# Patient Record
Sex: FEMALE | Race: OTHER | HISPANIC OR LATINO
[De-identification: names, ages, dates, MRNs, and addresses within clinical notes are randomized per-mention and may not be internally consistent; named-entity substitution may affect disease eponyms.]

---

## 2018-03-16 ENCOUNTER — APPOINTMENT (OUTPATIENT)
Dept: DERMATOLOGY | Facility: CLINIC | Age: 38
End: 2018-03-16
Payer: COMMERCIAL

## 2018-03-16 ENCOUNTER — LABORATORY RESULT (OUTPATIENT)
Age: 38
End: 2018-03-16

## 2018-03-16 VITALS
HEIGHT: 63 IN | SYSTOLIC BLOOD PRESSURE: 134 MMHG | DIASTOLIC BLOOD PRESSURE: 60 MMHG | BODY MASS INDEX: 35.44 KG/M2 | WEIGHT: 200 LBS

## 2018-03-16 DIAGNOSIS — L30.9 DERMATITIS, UNSPECIFIED: ICD-10-CM

## 2018-03-16 DIAGNOSIS — D48.9 NEOPLASM OF UNCERTAIN BEHAVIOR, UNSPECIFIED: ICD-10-CM

## 2018-03-16 PROCEDURE — 99203 OFFICE O/P NEW LOW 30 MIN: CPT | Mod: 25

## 2018-03-16 PROCEDURE — 11100 BX SKIN SUBCUTANEOUS&/MUCOUS MEMBRANE 1 LESION: CPT

## 2018-03-17 PROBLEM — L30.9 NIPPLE DERMATITIS: Status: ACTIVE | Noted: 2018-03-16

## 2018-03-17 LAB
ENA RNP AB SER IA-ACNC: <0.2 AL
ENA SM AB SER IA-ACNC: <0.2 AL

## 2018-03-26 ENCOUNTER — APPOINTMENT (OUTPATIENT)
Dept: DERMATOLOGY | Facility: CLINIC | Age: 38
End: 2018-03-26
Payer: COMMERCIAL

## 2018-03-26 VITALS — DIASTOLIC BLOOD PRESSURE: 70 MMHG | SYSTOLIC BLOOD PRESSURE: 112 MMHG

## 2018-03-26 DIAGNOSIS — Z87.2 PERSONAL HISTORY OF DISEASES OF THE SKIN AND SUBCUTANEOUS TISSUE: ICD-10-CM

## 2018-03-26 LAB
ANA PAT FLD IF-IMP: ABNORMAL
ANA SER IF-ACNC: ABNORMAL
ENA SS-A AB SER IA-ACNC: <0.2 AL
ENA SS-B AB SER IA-ACNC: <0.2 AL

## 2018-03-26 PROCEDURE — 99213 OFFICE O/P EST LOW 20 MIN: CPT

## 2018-05-10 ENCOUNTER — APPOINTMENT (OUTPATIENT)
Dept: RHEUMATOLOGY | Facility: CLINIC | Age: 38
End: 2018-05-10
Payer: COMMERCIAL

## 2018-05-10 ENCOUNTER — LABORATORY RESULT (OUTPATIENT)
Age: 38
End: 2018-05-10

## 2018-05-10 VITALS
OXYGEN SATURATION: 97 % | HEIGHT: 63 IN | DIASTOLIC BLOOD PRESSURE: 89 MMHG | BODY MASS INDEX: 37.21 KG/M2 | TEMPERATURE: 98.2 F | SYSTOLIC BLOOD PRESSURE: 143 MMHG | WEIGHT: 210 LBS | HEART RATE: 71 BPM

## 2018-05-10 DIAGNOSIS — R76.8 OTHER SPECIFIED ABNORMAL IMMUNOLOGICAL FINDINGS IN SERUM: ICD-10-CM

## 2018-05-10 LAB
APTT BLD: 30.4 SEC
BASOPHILS # BLD AUTO: 0.02 K/UL
BASOPHILS NFR BLD AUTO: 0.3 %
EOSINOPHIL # BLD AUTO: 0.15 K/UL
EOSINOPHIL NFR BLD AUTO: 2 %
HCT VFR BLD CALC: 35.4 %
HGB BLD-MCNC: 11.1 G/DL
IMM GRANULOCYTES NFR BLD AUTO: 0.3 %
INR PPP: 0.94 RATIO
LYMPHOCYTES # BLD AUTO: 2.43 K/UL
LYMPHOCYTES NFR BLD AUTO: 32.5 %
MAN DIFF?: NORMAL
MCHC RBC-ENTMCNC: 26.4 PG
MCHC RBC-ENTMCNC: 31.4 GM/DL
MCV RBC AUTO: 84.3 FL
MONOCYTES # BLD AUTO: 0.48 K/UL
MONOCYTES NFR BLD AUTO: 6.4 %
NEUTROPHILS # BLD AUTO: 4.37 K/UL
NEUTROPHILS NFR BLD AUTO: 58.5 %
PLATELET # BLD AUTO: 333 K/UL
PT BLD: 10.6 SEC
RBC # BLD: 4.2 M/UL
RBC # FLD: 12.6 %
WBC # FLD AUTO: 7.47 K/UL

## 2018-05-10 PROCEDURE — 99244 OFF/OP CNSLTJ NEW/EST MOD 40: CPT

## 2018-05-11 LAB
ALBUMIN SERPL ELPH-MCNC: 4.2 G/DL
ALP BLD-CCNC: 77 U/L
ALT SERPL-CCNC: 14 U/L
ANION GAP SERPL CALC-SCNC: 15 MMOL/L
AST SERPL-CCNC: 16 U/L
BILIRUB SERPL-MCNC: 0.2 MG/DL
BUN SERPL-MCNC: 11 MG/DL
CALCIUM SERPL-MCNC: 9.2 MG/DL
CHLORIDE SERPL-SCNC: 102 MMOL/L
CK SERPL-CCNC: 49 U/L
CO2 SERPL-SCNC: 23 MMOL/L
CREAT SERPL-MCNC: 0.65 MG/DL
GLUCOSE SERPL-MCNC: 93 MG/DL
MPO AB + PR3 PNL SER: NORMAL
POTASSIUM SERPL-SCNC: 4.6 MMOL/L
PROT SERPL-MCNC: 7.5 G/DL
SODIUM SERPL-SCNC: 140 MMOL/L

## 2018-05-12 LAB
ACE BLD-CCNC: 37 U/L
B2 GLYCOPROT1 AB SER QL: POSITIVE
B2 GLYCOPROT1 IGA SERPL IA-ACNC: <5 SAU
DSDNA AB SER-ACNC: <12 IU/ML
THYROGLOB AB SERPL-ACNC: <20 IU/ML
THYROPEROXIDASE AB SERPL IA-ACNC: 14 IU/ML

## 2018-05-15 LAB — CARDIOLIPIN AB SER IA-ACNC: NEGATIVE

## 2018-05-17 ENCOUNTER — APPOINTMENT (OUTPATIENT)
Dept: DERMATOLOGY | Facility: CLINIC | Age: 38
End: 2018-05-17
Payer: COMMERCIAL

## 2018-05-17 VITALS — DIASTOLIC BLOOD PRESSURE: 70 MMHG | SYSTOLIC BLOOD PRESSURE: 120 MMHG

## 2018-05-17 DIAGNOSIS — L30.1 DYSHIDROSIS [POMPHOLYX]: ICD-10-CM

## 2018-05-17 DIAGNOSIS — L81.0 POSTINFLAMMATORY HYPERPIGMENTATION: ICD-10-CM

## 2018-05-17 DIAGNOSIS — L53.1 ERYTHEMA ANNULARE CENTRIFUGUM: ICD-10-CM

## 2018-05-17 PROCEDURE — 99213 OFFICE O/P EST LOW 20 MIN: CPT

## 2018-05-18 PROBLEM — L53.1 ERYTHEMA ANNULARE CENTRIFUGUM: Status: ACTIVE | Noted: 2018-03-26

## 2019-04-30 ENCOUNTER — TRANSCRIPTION ENCOUNTER (OUTPATIENT)
Age: 39
End: 2019-04-30

## 2020-04-25 ENCOUNTER — MESSAGE (OUTPATIENT)
Age: 40
End: 2020-04-25

## 2020-05-01 ENCOUNTER — APPOINTMENT (OUTPATIENT)
Dept: OBGYN | Facility: CLINIC | Age: 40
End: 2020-05-01

## 2020-06-05 ENCOUNTER — LABORATORY RESULT (OUTPATIENT)
Age: 40
End: 2020-06-05

## 2020-06-05 ENCOUNTER — APPOINTMENT (OUTPATIENT)
Dept: OBGYN | Facility: CLINIC | Age: 40
End: 2020-06-05
Payer: COMMERCIAL

## 2020-06-05 VITALS
DIASTOLIC BLOOD PRESSURE: 95 MMHG | HEART RATE: 81 BPM | TEMPERATURE: 98.2 F | SYSTOLIC BLOOD PRESSURE: 148 MMHG | BODY MASS INDEX: 38.62 KG/M2 | WEIGHT: 218 LBS | HEIGHT: 63 IN

## 2020-06-05 DIAGNOSIS — Z01.419 ENCOUNTER FOR GYNECOLOGICAL EXAMINATION (GENERAL) (ROUTINE) W/OUT ABNORMAL FINDINGS: ICD-10-CM

## 2020-06-05 DIAGNOSIS — Z80.49 FAMILY HISTORY OF MALIGNANT NEOPLASM OF OTHER GENITAL ORGANS: ICD-10-CM

## 2020-06-05 PROCEDURE — 99385 PREV VISIT NEW AGE 18-39: CPT

## 2020-06-05 NOTE — HISTORY OF PRESENT ILLNESS
[___ Year(s) Ago] : [unfilled] year(s) ago [Healthy Diet] : a healthy diet [Regular Exercise] : regular exercise [Weight Concerns] : weight concens [Last Pap Smear ___] : last Papanicolaou cytology done [unfilled] [Reproductive Age] : is of reproductive age [Menstrual Problems] : reports normal menses [Up to Date] : up to date with ~his/her~ STD screening [Definite:  ___ (Date)] : the last menstrual period was [unfilled] [Normal Amount/Duration] : was of a normal amount and duration [Spotting Between  Menses] : no spotting between menses [Regular Cycle Intervals] : periods have been regular [Frequency: Q ___ days] : menstrual periods occur approximately every [unfilled] days [Menarche Age: ____] : age at menarche was [unfilled] [Menstrual Cramps] : menstrual cramps [Sexually Active] : is sexually active

## 2020-06-08 LAB
ESTRADIOL SERPL-MCNC: 70 PG/ML
FSH SERPL-MCNC: 5.1 IU/L
LH SERPL-ACNC: 6.9 IU/L
PROLACTIN SERPL-MCNC: 23.1 NG/ML
T3FREE SERPL-MCNC: 3.01 PG/ML
T4 FREE SERPL-MCNC: 1.2 NG/DL
TSH SERPL-ACNC: 1.32 UIU/ML

## 2020-06-11 LAB
CYTOLOGY CVX/VAG DOC THIN PREP: NORMAL
HPV HIGH+LOW RISK DNA PNL CVX: DETECTED

## 2020-06-12 LAB
ANTI-MUELLERIAN HORMONE: 0.24 NG/ML
TESTOST BND SERPL-MCNC: 1.7 PG/ML
TESTOST SERPL-MCNC: 17.5 NG/DL

## 2020-06-14 LAB — DHEA-SULFATE, SERUM: 111 UG/DL

## 2020-09-22 ENCOUNTER — ASOB RESULT (OUTPATIENT)
Age: 40
End: 2020-09-22

## 2020-09-22 ENCOUNTER — APPOINTMENT (OUTPATIENT)
Dept: OBGYN | Facility: CLINIC | Age: 40
End: 2020-09-22
Payer: COMMERCIAL

## 2020-09-22 PROCEDURE — 76830 TRANSVAGINAL US NON-OB: CPT

## 2020-10-16 ENCOUNTER — APPOINTMENT (OUTPATIENT)
Dept: OBGYN | Facility: CLINIC | Age: 40
End: 2020-10-16
Payer: COMMERCIAL

## 2020-10-16 DIAGNOSIS — R52 PAIN, UNSPECIFIED: ICD-10-CM

## 2020-10-16 LAB
HCG UR QL: NEGATIVE
QUALITY CONTROL: YES

## 2020-10-16 PROCEDURE — 57454 BX/CURETT OF CERVIX W/SCOPE: CPT

## 2020-10-16 PROCEDURE — 58110 BX DONE W/COLPOSCOPY ADD-ON: CPT

## 2020-10-16 RX ORDER — IBUPROFEN 600 MG/1
600 TABLET, FILM COATED ORAL
Qty: 0 | Refills: 0 | Status: COMPLETED | OUTPATIENT
Start: 2020-10-16

## 2020-10-21 LAB — CORE LAB BIOPSY: NORMAL

## 2020-11-09 ENCOUNTER — APPOINTMENT (OUTPATIENT)
Dept: GYNECOLOGIC ONCOLOGY | Facility: CLINIC | Age: 40
End: 2020-11-09
Payer: COMMERCIAL

## 2020-11-09 VITALS
SYSTOLIC BLOOD PRESSURE: 140 MMHG | OXYGEN SATURATION: 98 % | DIASTOLIC BLOOD PRESSURE: 82 MMHG | HEIGHT: 63 IN | WEIGHT: 212 LBS | TEMPERATURE: 98.4 F | HEART RATE: 74 BPM | BODY MASS INDEX: 37.56 KG/M2

## 2020-11-09 DIAGNOSIS — N85.02 ENDOMETRIAL INTRAEPITHELIAL NEOPLASIA [EIN]: ICD-10-CM

## 2020-11-09 DIAGNOSIS — R87.810 CERVICAL HIGH RISK HUMAN PAPILLOMAVIRUS (HPV) DNA TEST POSITIVE: ICD-10-CM

## 2020-11-09 PROCEDURE — 99072 ADDL SUPL MATRL&STAF TM PHE: CPT

## 2020-11-09 PROCEDURE — 99205 OFFICE O/P NEW HI 60 MIN: CPT

## 2020-12-04 ENCOUNTER — APPOINTMENT (OUTPATIENT)
Dept: INTERNAL MEDICINE | Facility: CLINIC | Age: 40
End: 2020-12-04
Payer: COMMERCIAL

## 2020-12-04 VITALS
OXYGEN SATURATION: 98 % | DIASTOLIC BLOOD PRESSURE: 96 MMHG | HEART RATE: 73 BPM | BODY MASS INDEX: 36.68 KG/M2 | SYSTOLIC BLOOD PRESSURE: 153 MMHG | TEMPERATURE: 98 F | HEIGHT: 63 IN | WEIGHT: 207 LBS

## 2020-12-04 VITALS — DIASTOLIC BLOOD PRESSURE: 72 MMHG | SYSTOLIC BLOOD PRESSURE: 114 MMHG

## 2020-12-04 DIAGNOSIS — Z01.818 ENCOUNTER FOR OTHER PREPROCEDURAL EXAMINATION: ICD-10-CM

## 2020-12-04 DIAGNOSIS — Z82.49 FAMILY HISTORY OF ISCHEMIC HEART DISEASE AND OTHER DISEASES OF THE CIRCULATORY SYSTEM: ICD-10-CM

## 2020-12-04 DIAGNOSIS — Z83.3 FAMILY HISTORY OF DIABETES MELLITUS: ICD-10-CM

## 2020-12-04 DIAGNOSIS — Z87.891 PERSONAL HISTORY OF NICOTINE DEPENDENCE: ICD-10-CM

## 2020-12-04 DIAGNOSIS — N93.9 ABNORMAL UTERINE AND VAGINAL BLEEDING, UNSPECIFIED: ICD-10-CM

## 2020-12-04 DIAGNOSIS — R73.01 IMPAIRED FASTING GLUCOSE: ICD-10-CM

## 2020-12-04 PROCEDURE — 99072 ADDL SUPL MATRL&STAF TM PHE: CPT

## 2020-12-04 PROCEDURE — 99205 OFFICE O/P NEW HI 60 MIN: CPT

## 2020-12-04 RX ORDER — CETIRIZINE HYDROCHLORIDE 10 MG/1
10 TABLET, FILM COATED ORAL
Qty: 60 | Refills: 0 | Status: COMPLETED | COMMUNITY
Start: 2018-03-26 | End: 2020-12-04

## 2020-12-04 RX ORDER — AZITHROMYCIN 250 MG/1
250 TABLET, FILM COATED ORAL DAILY
Qty: 30 | Refills: 0 | Status: COMPLETED | COMMUNITY
Start: 2018-03-26 | End: 2020-12-04

## 2020-12-04 RX ORDER — TRANEXAMIC ACID 650 MG/1
650 TABLET ORAL
Qty: 30 | Refills: 2 | Status: COMPLETED | COMMUNITY
Start: 2020-09-25 | End: 2020-12-04

## 2020-12-04 RX ORDER — HYDROXYZINE HYDROCHLORIDE 25 MG/1
25 TABLET ORAL
Qty: 30 | Refills: 2 | Status: COMPLETED | COMMUNITY
Start: 2018-03-26 | End: 2020-12-04

## 2020-12-08 ENCOUNTER — OUTPATIENT (OUTPATIENT)
Dept: OUTPATIENT SERVICES | Facility: HOSPITAL | Age: 40
LOS: 1 days | End: 2020-12-08

## 2020-12-08 VITALS
SYSTOLIC BLOOD PRESSURE: 140 MMHG | OXYGEN SATURATION: 98 % | WEIGHT: 210.1 LBS | HEART RATE: 76 BPM | TEMPERATURE: 99 F | DIASTOLIC BLOOD PRESSURE: 80 MMHG | RESPIRATION RATE: 16 BRPM | HEIGHT: 63 IN

## 2020-12-08 DIAGNOSIS — Z98.84 BARIATRIC SURGERY STATUS: Chronic | ICD-10-CM

## 2020-12-08 DIAGNOSIS — N85.02 ENDOMETRIAL INTRAEPITHELIAL NEOPLASIA [EIN]: ICD-10-CM

## 2020-12-08 DIAGNOSIS — Z90.49 ACQUIRED ABSENCE OF OTHER SPECIFIED PARTS OF DIGESTIVE TRACT: Chronic | ICD-10-CM

## 2020-12-08 LAB
ANION GAP SERPL CALC-SCNC: 10 MMOL/L — SIGNIFICANT CHANGE UP (ref 7–14)
BLD GP AB SCN SERPL QL: NEGATIVE — SIGNIFICANT CHANGE UP
BUN SERPL-MCNC: 12 MG/DL — SIGNIFICANT CHANGE UP (ref 7–23)
CALCIUM SERPL-MCNC: 9 MG/DL — SIGNIFICANT CHANGE UP (ref 8.4–10.5)
CHLORIDE SERPL-SCNC: 100 MMOL/L — SIGNIFICANT CHANGE UP (ref 98–107)
CO2 SERPL-SCNC: 26 MMOL/L — SIGNIFICANT CHANGE UP (ref 22–31)
CREAT SERPL-MCNC: 0.59 MG/DL — SIGNIFICANT CHANGE UP (ref 0.5–1.3)
GLUCOSE SERPL-MCNC: 83 MG/DL — SIGNIFICANT CHANGE UP (ref 70–99)
HCG UR QL: NEGATIVE — SIGNIFICANT CHANGE UP
HCT VFR BLD CALC: 37.1 % — SIGNIFICANT CHANGE UP (ref 34.5–45)
HGB BLD-MCNC: 11.5 G/DL — SIGNIFICANT CHANGE UP (ref 11.5–15.5)
MCHC RBC-ENTMCNC: 25.9 PG — LOW (ref 27–34)
MCHC RBC-ENTMCNC: 31 GM/DL — LOW (ref 32–36)
MCV RBC AUTO: 83.6 FL — SIGNIFICANT CHANGE UP (ref 80–100)
NRBC # BLD: 0 /100 WBCS — SIGNIFICANT CHANGE UP
NRBC # FLD: 0 K/UL — SIGNIFICANT CHANGE UP
PLATELET # BLD AUTO: 306 K/UL — SIGNIFICANT CHANGE UP (ref 150–400)
POTASSIUM SERPL-MCNC: 4 MMOL/L — SIGNIFICANT CHANGE UP (ref 3.5–5.3)
POTASSIUM SERPL-SCNC: 4 MMOL/L — SIGNIFICANT CHANGE UP (ref 3.5–5.3)
RBC # BLD: 4.44 M/UL — SIGNIFICANT CHANGE UP (ref 3.8–5.2)
RBC # FLD: 13.4 % — SIGNIFICANT CHANGE UP (ref 10.3–14.5)
RH IG SCN BLD-IMP: POSITIVE — SIGNIFICANT CHANGE UP
SODIUM SERPL-SCNC: 136 MMOL/L — SIGNIFICANT CHANGE UP (ref 135–145)
WBC # BLD: 10.26 K/UL — SIGNIFICANT CHANGE UP (ref 3.8–10.5)
WBC # FLD AUTO: 10.26 K/UL — SIGNIFICANT CHANGE UP (ref 3.8–10.5)

## 2020-12-08 RX ORDER — SODIUM CHLORIDE 9 MG/ML
1000 INJECTION, SOLUTION INTRAVENOUS
Refills: 0 | Status: DISCONTINUED | OUTPATIENT
Start: 2020-12-15 | End: 2020-12-29

## 2020-12-08 RX ORDER — SODIUM CHLORIDE 9 MG/ML
3 INJECTION INTRAMUSCULAR; INTRAVENOUS; SUBCUTANEOUS EVERY 8 HOURS
Refills: 0 | Status: DISCONTINUED | OUTPATIENT
Start: 2020-12-15 | End: 2020-12-29

## 2020-12-08 NOTE — H&P PST ADULT - ATTENDING COMMENTS
patient seen and evaluated, plan for RA TLH, BS, possible BSO, cystoscopy, possible open surgery  all indicated procedures  discussed risks including bleeding, infection, injury to bowel/bladder/vessels/nerves/ureters, risks of blood transfusion, reoperation, ICU admission  all questions answered

## 2020-12-08 NOTE — H&P PST ADULT - NEGATIVE CARDIOVASCULAR SYMPTOMS
no paroxysmal nocturnal dyspnea/no palpitations/no orthopnea/no dyspnea on exertion/no peripheral edema/no chest pain/no claudication

## 2020-12-08 NOTE — H&P PST ADULT - NSANTHOSAYNRD_GEN_A_CORE
No. DELANEY screening performed.  STOP BANG Legend: 0-2 = LOW Risk; 3-4 = INTERMEDIATE Risk; 5-8 = HIGH Risk

## 2020-12-08 NOTE — H&P PST ADULT - RS GEN PE MLT RESP DETAILS PC
airway patent/no rhonchi/no subcutaneous emphysema/no wheezes/clear to auscultation bilaterally/no intercostal retractions/no rales/good air movement/respirations non-labored/breath sounds equal/no chest wall tenderness

## 2020-12-08 NOTE — H&P PST ADULT - NSICDXPASTSURGICALHX_GEN_ALL_CORE_FT
PAST SURGICAL HISTORY:  No significant past surgical history      PAST SURGICAL HISTORY:  History of laparoscopic adjustable gastric banding 2004    History of laparoscopic cholecystectomy 2006

## 2020-12-08 NOTE — H&P PST ADULT - NSICDXPROBLEM_GEN_ALL_CORE_FT
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 Pre op instructions, famotidine, chlorhexidine gluconate soap given and explained. Pt verbalized understanding.  Pending medical consultation as per surgeon's request  Pt confirmed scheduled appt for Covid-19 test pre op  Pt instructed to bring urine specimen on day of surgery, container given to pt who verbalized understanding.

## 2020-12-08 NOTE — H&P PST ADULT - HISTORY OF PRESENT ILLNESS
39 y/o female presents to [facility] for pre op evaluation abnormal, irregular menstruation started on 03/2020, Eval by GYN on 05/2020. C/O intermittent severe pelvic pain , heavy and prolonged periods. S/P US. S/P biopsy on 11/2020. Pre op diagnosis: Endometrial Intraepithelial Neoplasia (EIN). Now scheduled for robotic assisted total laparoscopic hysterectomy, BSO, sentinel lymph node mapping and dissection on 12/15/2020 41 y/o female presents to [facility] for pre op evaluation with h/o abnormal, irregular menstruation started on 03/2020, Eval by GYN on 05/2020. C/O intermittent severe pelvic pain , heavy and prolonged periods. S/P US. S/P biopsy on 11/2020. Pre op diagnosis: Endometrial Intraepithelial Neoplasia (EIN). Now scheduled for robotic assisted total laparoscopic hysterectomy, BSO, sentinel lymph node mapping and dissection on 12/15/2020

## 2020-12-08 NOTE — H&P PST ADULT - NEGATIVE GENERAL GENITOURINARY SYMPTOMS
no renal colic/no hematuria/no flank pain L/no flank pain R/no bladder infections no flank pain R/no renal colic/normal urinary frequency/no gas in urine/no bladder infections/no dysuria/no hematuria/no flank pain L/no urine discoloration

## 2020-12-08 NOTE — H&P PST ADULT - NEGATIVE GASTROINTESTINAL SYMPTOMS
no constipation/no abdominal pain/no jaundice/no hiccoughs/no nausea/no vomiting/no diarrhea/no change in bowel habits no abdominal pain/no melena/no jaundice/no vomiting/no nausea/no diarrhea/no change in bowel habits/no constipation/no hiccoughs

## 2020-12-11 DIAGNOSIS — Z01.818 ENCOUNTER FOR OTHER PREPROCEDURAL EXAMINATION: ICD-10-CM

## 2020-12-11 NOTE — PHYSICAL EXAM
[No Acute Distress] : no acute distress [Well Nourished] : well nourished [Well Developed] : well developed [Normal Sclera/Conjunctiva] : normal sclera/conjunctiva [PERRL] : pupils equal round and reactive to light [EOMI] : extraocular movements intact [Normal Outer Ear/Nose] : the outer ears and nose were normal in appearance [Normal Oropharynx] : the oropharynx was normal [Normal TMs] : both tympanic membranes were normal [Normal Nasal Mucosa] : the nasal mucosa was normal [No JVD] : no jugular venous distention [No Lymphadenopathy] : no lymphadenopathy [Supple] : supple [No Respiratory Distress] : no respiratory distress  [Clear to Auscultation] : lungs were clear to auscultation bilaterally [Normal Rate] : normal rate  [Regular Rhythm] : with a regular rhythm [Normal S1, S2] : normal S1 and S2 [No Carotid Bruits] : no carotid bruits [Pedal Pulses Present] : the pedal pulses are present [No Edema] : there was no peripheral edema [No Extremity Clubbing/Cyanosis] : no extremity clubbing/cyanosis [Soft] : abdomen soft [Non Tender] : non-tender [Non-distended] : non-distended [Normal Bowel Sounds] : normal bowel sounds [Normal Supraclavicular Nodes] : no supraclavicular lymphadenopathy [Normal Posterior Cervical Nodes] : no posterior cervical lymphadenopathy [Normal Anterior Cervical Nodes] : no anterior cervical lymphadenopathy [No CVA Tenderness] : no CVA  tenderness [No Spinal Tenderness] : no spinal tenderness [No Joint Swelling] : no joint swelling [Grossly Normal Strength/Tone] : grossly normal strength/tone [No Rash] : no rash [Coordination Grossly Intact] : coordination grossly intact [No Focal Deficits] : no focal deficits [Normal Gait] : normal gait [Speech Grossly Normal] : speech grossly normal [Normal Affect] : the affect was normal [Alert and Oriented x3] : oriented to person, place, and time [Normal Mood] : the mood was normal [de-identified] : Obese female in stated age,

## 2020-12-11 NOTE — PLAN
[FreeTextEntry1] : \par \par ADDENDUM 12/11/2020 - Preop labs done on 12/8/2020:\par \par * CBC - normal\par * BMP - normal\par * Urine pregnancy test - negative\par \par Patient has no medical contraindication for proposed procedure.  Perioperative management as above.

## 2020-12-11 NOTE — ASSESSMENT
[Patient Requires Further Testing] : Patient requires further testing [Other: _____] : [unfilled] [As per surgery] : as per surgery [FreeTextEntry7] : Pt is not on any meds, but was advised to avoid all NSAIDs for 1 week prior to surgery.

## 2020-12-11 NOTE — REVIEW OF SYSTEMS
[Negative] : Heme/Lymph [Fever] : no fever [Chills] : no chills [Fatigue] : no fatigue [Recent Change In Weight] : ~T no recent weight change [Chest Pain] : no chest pain [Palpitations] : no palpitations [Lower Ext Edema] : no lower extremity edema [Shortness Of Breath] : no shortness of breath [Wheezing] : no wheezing [Cough] : no cough [Dyspnea on Exertion] : no dyspnea on exertion [Abdominal Pain] : no abdominal pain [Nausea] : no nausea [Constipation] : no constipation [Diarrhea] : diarrhea [Vomiting] : no vomiting [Heartburn] : no heartburn [Melena] : no melena [Dysuria] : no dysuria [Incontinence] : no incontinence [Nocturia] : no nocturia [Hematuria] : no hematuria [Joint Pain] : no joint pain [Joint Stiffness] : no joint stiffness [Joint Swelling] : no joint swelling [Muscle Pain] : no muscle pain [Back Pain] : no back pain [Itching] : no itching [Mole Changes] : no mole changes [Skin Rash] : no skin rash [Headache] : no headache [Dizziness] : no dizziness [Fainting] : no fainting [Unsteady Walking] : no ataxia [Insomnia] : no insomnia [Anxiety] : no anxiety [Depression] : no depression [Easy Bleeding] : no easy bleeding [Easy Bruising] : no easy bruising [Swollen Glands] : no swollen glands [FreeTextEntry3] : wears corrective lens,

## 2020-12-11 NOTE — HISTORY OF PRESENT ILLNESS
[No Pertinent Cardiac History] : no history of aortic stenosis, atrial fibrillation, coronary artery disease, recent myocardial infarction, or implantable device/pacemaker [No Pertinent Pulmonary History] : no history of asthma, COPD, sleep apnea, or smoking [No Adverse Anesthesia Reaction] : no adverse anesthesia reaction in self or family member [(Patient denies any chest pain, claudication, dyspnea on exertion, orthopnea, palpitations or syncope)] : Patient denies any chest pain, claudication, dyspnea on exertion, orthopnea, palpitations or syncope [Good (7-10 METs)] : Good (7-10 METs) [Chronic Anticoagulation] : no chronic anticoagulation [Chronic Kidney Disease] : no chronic kidney disease [Diabetes] : no diabetes [FreeTextEntry1] : Hysterectomy [FreeTextEntry2] : 12/15/2020, Tuesday [FreeTextEntry3] : Dr. Jaylene Arango [FreeTextEntry4] : Pt presented for medical clearance because she is going for hysterectomy at [facility].\par \par Pt was having irregular menstrual bleeding in 3/2020.  She finally went to see GYN in 6/2020, she had Pap smear and it was positive for HPV.  GYN did not  think it was related to the abnormal bleeding.  She went back to see GYN, and was given Tranexamic, and it did helped with controlling the symptoms.  She also had a pelvis US, then she had an endometrial biopsy.  Pt was told that she has abnormal path, and pt was sent to GYN oncologist.  Pt has a h/o fallopian tube obstruction in the past.  When she went to see GYN oncologist, after a conversation.  Pt was not planning to conceive anymore.  Pt was then recommended to have a hysterectomy.

## 2020-12-11 NOTE — CONSULT LETTER
[Dear  ___] : Dear  [unfilled], [Consult Letter:] : I had the pleasure of evaluating your patient, [unfilled]. [Please see my note below.] : Please see my note below. [Consult Closing:] : Thank you very much for allowing me to participate in the care of this patient.  If you have any questions, please do not hesitate to contact me. [Sincerely,] : Sincerely, [FreeTextEntry1] : I saw the patient today for preop evaluation.  [FreeTextEntry3] : Asia Infante MD

## 2020-12-12 ENCOUNTER — TRANSCRIPTION ENCOUNTER (OUTPATIENT)
Age: 40
End: 2020-12-12

## 2020-12-12 ENCOUNTER — APPOINTMENT (OUTPATIENT)
Dept: DISASTER EMERGENCY | Facility: CLINIC | Age: 40
End: 2020-12-12

## 2020-12-13 LAB — SARS-COV-2 N GENE NPH QL NAA+PROBE: NOT DETECTED

## 2020-12-14 ENCOUNTER — TRANSCRIPTION ENCOUNTER (OUTPATIENT)
Age: 40
End: 2020-12-14

## 2020-12-14 PROBLEM — E66.9 OBESITY, UNSPECIFIED: Chronic | Status: ACTIVE | Noted: 2020-12-08

## 2020-12-14 NOTE — ASU PATIENT PROFILE, ADULT - PSH
History of laparoscopic adjustable gastric banding  2004  History of laparoscopic cholecystectomy  2006

## 2020-12-15 ENCOUNTER — APPOINTMENT (OUTPATIENT)
Dept: GYNECOLOGIC ONCOLOGY | Facility: HOSPITAL | Age: 40
End: 2020-12-15

## 2020-12-15 ENCOUNTER — RESULT REVIEW (OUTPATIENT)
Age: 40
End: 2020-12-15

## 2020-12-15 ENCOUNTER — OUTPATIENT (OUTPATIENT)
Dept: OUTPATIENT SERVICES | Facility: HOSPITAL | Age: 40
LOS: 1 days | Discharge: HOME | End: 2020-12-15
Payer: COMMERCIAL

## 2020-12-15 ENCOUNTER — NON-APPOINTMENT (OUTPATIENT)
Age: 40
End: 2020-12-15

## 2020-12-15 VITALS
RESPIRATION RATE: 16 BRPM | HEART RATE: 63 BPM | HEIGHT: 63 IN | WEIGHT: 210.1 LBS | DIASTOLIC BLOOD PRESSURE: 92 MMHG | TEMPERATURE: 99 F | OXYGEN SATURATION: 98 % | SYSTOLIC BLOOD PRESSURE: 168 MMHG

## 2020-12-15 VITALS
HEART RATE: 73 BPM | DIASTOLIC BLOOD PRESSURE: 77 MMHG | SYSTOLIC BLOOD PRESSURE: 133 MMHG | RESPIRATION RATE: 17 BRPM | OXYGEN SATURATION: 98 %

## 2020-12-15 DIAGNOSIS — N85.02 ENDOMETRIAL INTRAEPITHELIAL NEOPLASIA [EIN]: ICD-10-CM

## 2020-12-15 DIAGNOSIS — Z98.84 BARIATRIC SURGERY STATUS: Chronic | ICD-10-CM

## 2020-12-15 DIAGNOSIS — Z90.49 ACQUIRED ABSENCE OF OTHER SPECIFIED PARTS OF DIGESTIVE TRACT: Chronic | ICD-10-CM

## 2020-12-15 LAB
BASOPHILS # BLD AUTO: 0.03 K/UL — SIGNIFICANT CHANGE UP (ref 0–0.2)
BASOPHILS NFR BLD AUTO: 0.2 % — SIGNIFICANT CHANGE UP (ref 0–2)
EOSINOPHIL # BLD AUTO: 0 K/UL — SIGNIFICANT CHANGE UP (ref 0–0.5)
EOSINOPHIL NFR BLD AUTO: 0 % — SIGNIFICANT CHANGE UP (ref 0–6)
HCG UR QL: NEGATIVE — SIGNIFICANT CHANGE UP
HCT VFR BLD CALC: 36.1 % — SIGNIFICANT CHANGE UP (ref 34.5–45)
HGB BLD-MCNC: 11.1 G/DL — LOW (ref 11.5–15.5)
IANC: 17.35 K/UL — HIGH (ref 1.5–8.5)
IMM GRANULOCYTES NFR BLD AUTO: 0.5 % — SIGNIFICANT CHANGE UP (ref 0–1.5)
LYMPHOCYTES # BLD AUTO: 0.61 K/UL — LOW (ref 1–3.3)
LYMPHOCYTES # BLD AUTO: 3.3 % — LOW (ref 13–44)
MCHC RBC-ENTMCNC: 26.6 PG — LOW (ref 27–34)
MCHC RBC-ENTMCNC: 30.7 GM/DL — LOW (ref 32–36)
MCV RBC AUTO: 86.4 FL — SIGNIFICANT CHANGE UP (ref 80–100)
MONOCYTES # BLD AUTO: 0.16 K/UL — SIGNIFICANT CHANGE UP (ref 0–0.9)
MONOCYTES NFR BLD AUTO: 0.9 % — LOW (ref 2–14)
NEUTROPHILS # BLD AUTO: 17.35 K/UL — HIGH (ref 1.8–7.4)
NEUTROPHILS NFR BLD AUTO: 95.1 % — HIGH (ref 43–77)
NRBC # BLD: 0 /100 WBCS — SIGNIFICANT CHANGE UP
NRBC # FLD: 0 K/UL — SIGNIFICANT CHANGE UP
PLATELET # BLD AUTO: 272 K/UL — SIGNIFICANT CHANGE UP (ref 150–400)
RBC # BLD: 4.18 M/UL — SIGNIFICANT CHANGE UP (ref 3.8–5.2)
RBC # FLD: 13.5 % — SIGNIFICANT CHANGE UP (ref 10.3–14.5)
WBC # BLD: 18.25 K/UL — HIGH (ref 3.8–10.5)
WBC # FLD AUTO: 18.25 K/UL — HIGH (ref 3.8–10.5)

## 2020-12-15 PROCEDURE — 88331 PATH CONSLTJ SURG 1 BLK 1SPC: CPT | Mod: 26

## 2020-12-15 PROCEDURE — S2900 ROBOTIC SURGICAL SYSTEM: CPT | Mod: NC

## 2020-12-15 PROCEDURE — 88342 IMHCHEM/IMCYTCHM 1ST ANTB: CPT | Mod: 26

## 2020-12-15 PROCEDURE — 88112 CYTOPATH CELL ENHANCE TECH: CPT | Mod: 26

## 2020-12-15 PROCEDURE — 88305 TISSUE EXAM BY PATHOLOGIST: CPT | Mod: 26

## 2020-12-15 PROCEDURE — 38570 LAPAROSCOPY LYMPH NODE BIOP: CPT

## 2020-12-15 PROCEDURE — 38900 IO MAP OF SENT LYMPH NODE: CPT

## 2020-12-15 PROCEDURE — 88307 TISSUE EXAM BY PATHOLOGIST: CPT | Mod: 26

## 2020-12-15 PROCEDURE — 58571 TLH W/T/O 250 G OR LESS: CPT

## 2020-12-15 RX ORDER — IBUPROFEN 200 MG
1 TABLET ORAL
Qty: 0 | Refills: 0 | DISCHARGE

## 2020-12-15 RX ORDER — SODIUM CHLORIDE 9 MG/ML
1000 INJECTION, SOLUTION INTRAVENOUS
Refills: 0 | Status: DISCONTINUED | OUTPATIENT
Start: 2020-12-15 | End: 2020-12-29

## 2020-12-15 RX ORDER — OXYCODONE HYDROCHLORIDE 5 MG/1
5 TABLET ORAL ONCE
Refills: 0 | Status: DISCONTINUED | OUTPATIENT
Start: 2020-12-15 | End: 2020-12-15

## 2020-12-15 RX ORDER — OXYCODONE HYDROCHLORIDE 5 MG/1
1 TABLET ORAL
Qty: 10 | Refills: 0
Start: 2020-12-15

## 2020-12-15 RX ORDER — HYDROMORPHONE HYDROCHLORIDE 2 MG/ML
0.5 INJECTION INTRAMUSCULAR; INTRAVENOUS; SUBCUTANEOUS ONCE
Refills: 0 | Status: DISCONTINUED | OUTPATIENT
Start: 2020-12-15 | End: 2020-12-15

## 2020-12-15 RX ORDER — ACETAMINOPHEN 500 MG
1 TABLET ORAL
Qty: 0 | Refills: 0 | DISCHARGE

## 2020-12-15 RX ORDER — ACETAMINOPHEN 500 MG
2 TABLET ORAL
Qty: 0 | Refills: 0 | DISCHARGE

## 2020-12-15 RX ADMIN — OXYCODONE HYDROCHLORIDE 5 MILLIGRAM(S): 5 TABLET ORAL at 18:19

## 2020-12-15 RX ADMIN — HYDROMORPHONE HYDROCHLORIDE 0.5 MILLIGRAM(S): 2 INJECTION INTRAMUSCULAR; INTRAVENOUS; SUBCUTANEOUS at 16:35

## 2020-12-15 RX ADMIN — HYDROMORPHONE HYDROCHLORIDE 0.5 MILLIGRAM(S): 2 INJECTION INTRAMUSCULAR; INTRAVENOUS; SUBCUTANEOUS at 16:19

## 2020-12-15 RX ADMIN — HYDROMORPHONE HYDROCHLORIDE 0.5 MILLIGRAM(S): 2 INJECTION INTRAMUSCULAR; INTRAVENOUS; SUBCUTANEOUS at 15:40

## 2020-12-15 RX ADMIN — HYDROMORPHONE HYDROCHLORIDE 0.5 MILLIGRAM(S): 2 INJECTION INTRAMUSCULAR; INTRAVENOUS; SUBCUTANEOUS at 15:55

## 2020-12-15 NOTE — PROGRESS NOTE ADULT - SUBJECTIVE AND OBJECTIVE BOX
PA GYN/ONC POST OP NOTE:    Pt seen and examined in PACU, was awake and alert and starting to feel better since receiving pain meds. Pt denies chest pain, SOB, palpitations, nausea/vomiting, dizziness, headache     Vital Signs Last 24 Hrs  T(C): 37.1 (15 Dec 2020 16:00), Max: 37.4 (15 Dec 2020 09:26)  T(F): 98.8 (15 Dec 2020 16:00), Max: 99.3 (15 Dec 2020 09:26)  HR: 70 (15 Dec 2020 17:45) (59 - 97)  BP: 137/84 (15 Dec 2020 17:45) (107/63 - 168/92)  BP(mean): 98 (15 Dec 2020 17:30) (72 - 98)  RR: 14 (15 Dec 2020 17:45) (11 - 17)  SpO2: 98% (15 Dec 2020 17:45) (94% - 100%)    U/O:    I&O's Detail    15 Dec 2020 07:01  -  15 Dec 2020 18:24  --------------------------------------------------------  IN:    Lactated Ringers: 30 mL    Lactated Ringers: 250 mL  Total IN: 280 mL    OUT:  Total OUT: 0 mL    Total NET: 280 mL    PHYSICAL EXAM:  CHEST/LUNG: CTA B/L  HEART: S1S2 RRR  ABDOMEN: Soft, appropriately tender  INCISION: Scope sites C/D/I   EXTREMITIES: NT B/L, Pt has Venodynes on for DVT ppx    LABS:                        11.1   18.25 )-----------( 272      ( 15 Dec 2020 17:21 )             36.1     MEDICATIONS  (STANDING):  lactated ringers. 1000 milliLiter(s) (125 mL/Hr) IV Continuous <Continuous>  lactated ringers. 1000 milliLiter(s) (30 mL/Hr) IV Continuous <Continuous>  oxyCODONE    IR 5 milliGRAM(s) Oral once  sodium chloride 0.9% lock flush 3 milliLiter(s) IV Push every 8 hours    MEDICATIONS  (PRN):       PA GYN/ONC POST OP NOTE:    Pt seen and examined in PACU, was awake and alert and starting to feel better since receiving pain meds. Pt denies chest pain, SOB, palpitations, nausea/vomiting, dizziness, headache.     Vital Signs Last 24 Hrs  T(C): 37.1 (15 Dec 2020 16:00), Max: 37.4 (15 Dec 2020 09:26)  T(F): 98.8 (15 Dec 2020 16:00), Max: 99.3 (15 Dec 2020 09:26)  HR: 70 (15 Dec 2020 17:45) (59 - 97)  BP: 137/84 (15 Dec 2020 17:45) (107/63 - 168/92)  BP(mean): 98 (15 Dec 2020 17:30) (72 - 98)  RR: 14 (15 Dec 2020 17:45) (11 - 17)  SpO2: 98% (15 Dec 2020 17:45) (94% - 100%)    U/O:    I&O's Detail    15 Dec 2020 07:01  -  15 Dec 2020 18:24  --------------------------------------------------------  IN:    Lactated Ringers: 30 mL    Lactated Ringers: 250 mL  Total IN: 280 mL    OUT:  Total OUT: 0 mL    Total NET: 280 mL    PHYSICAL EXAM:  CHEST/LUNG: CTA B/L  HEART: S1S2 RRR  ABDOMEN: Soft, appropriately tender  INCISION: Scope sites C/D/I   EXTREMITIES: NT B/L, Pt has Venodynes on for DVT ppx    LABS:                        11.1   18.25 )-----------( 272      ( 15 Dec 2020 17:21 )             36.1     MEDICATIONS  (STANDING):  lactated ringers. 1000 milliLiter(s) (125 mL/Hr) IV Continuous <Continuous>  lactated ringers. 1000 milliLiter(s) (30 mL/Hr) IV Continuous <Continuous>  oxyCODONE    IR 5 milliGRAM(s) Oral once  sodium chloride 0.9% lock flush 3 milliLiter(s) IV Push every 8 hours    MEDICATIONS  (PRN):

## 2020-12-15 NOTE — ASU DISCHARGE PLAN (ADULT/PEDIATRIC) - CALL YOUR DOCTOR IF YOU HAVE ANY OF THE FOLLOWING:
Bleeding that does not stop/Swelling that gets worse/Pain not relieved by Medications/Wound/Surgical Site with redness, or foul smelling discharge or pus/Numbness, tingling, color or temperature change to extremity/Nausea and vomiting that does not stop/Unable to urinate/Inability to tolerate liquids or foods

## 2020-12-15 NOTE — BRIEF OPERATIVE NOTE - NSICDXBRIEFPROCEDURE_GEN_ALL_CORE_FT
PROCEDURES:  Bilateral salpingectomy 15-Dec-2020 14:25:54 robot assisted Magy Kulkarni  Laparoscopic total hysterectomy with lymph node sampling and cystoscopy 15-Dec-2020 14:25:45 robot assisted Magy Kulkarni

## 2020-12-15 NOTE — ASU DISCHARGE PLAN (ADULT/PEDIATRIC) - CARE PROVIDER_API CALL
Jaylene Ying)  Gynecologic Oncology; Obstetrics and Gynecology  [address] [place] [address]  Phone: [phone]  Fax: [phone]  Established Patient  Follow Up Time: 2 weeks

## 2020-12-15 NOTE — ASU DISCHARGE PLAN (ADULT/PEDIATRIC) - NURSING INSTRUCTIONS
You received IV Tylenol for pain management at _210__. Please DO NOT take any Tylenol (Acetaminophen) containing products, such as Vicodin, Percocet, Excedrin, and cold medications for the next 6 hours (until _810_ PM). DO NOT TAKE MORE THAN 3000 MG OF TYLENOL in a 24 hour period.   You received IV Toradol for pain management at __215_. Please DO NOT take Motrin/Ibuprofen/Advil/Aleve/NSAIDs (Non-Steroidal Anti-Inflammatory Drugs) for the next 6 hours (until __815_ PM).  Nothing in vagina, no intercourse, no douching, no tampons, no tub baths, and no swimming for 2 weeks or until cleared by M.D.

## 2020-12-15 NOTE — ASU DISCHARGE PLAN (ADULT/PEDIATRIC) - ASU DC SPECIAL INSTRUCTIONSFT
nothing in vagina (sex, tampons, douching) no heavy lifting (>10 lbs) for 6 weeks. Please return to ER or call your doctors office if pain is worsening, you are unable to keep down food or drink, fever >100.4, heavy vaginal bleeding. You are able to take a shower regularly.

## 2020-12-15 NOTE — BRIEF OPERATIVE NOTE - OPERATION/FINDINGS
8cm anteverted uterus grossly normal uterus, bilateral fallopian tubes and ovaries. ICG mapped to R sentinel LN, removed. Upper abdominal survey limited 2/2 intra-abdominal adhesions. 1x large omental adhesion just inferior to umbilical port site.

## 2020-12-15 NOTE — PROGRESS NOTE ADULT - ASSESSMENT
A/P: 39 Y/o S/P Robotic TLH, BS    Continue IV Fluids LR @ 125mL/hr  DTV  Regular diet as tolerates  Pt for D/C home once voiding adequately, ambulating, tolerating some Regular diet and meeting PACU criteria      Addendum:  Pt voided, and meets PACU criteria  D/C home A/P: 41 Y/o S/P Robotic TLH, BS, SLNM, Cysto    Continue IV Fluids LR @ 125mL/hr  DTV  Regular diet as tolerates  Pt for D/C home once voiding adequately, ambulating, tolerating some Regular diet and meeting PACU criteria      Addendum:  Pt voided, and meets PACU criteria  D/C home

## 2020-12-16 LAB — NON-GYNECOLOGICAL CYTOLOGY STUDY: SIGNIFICANT CHANGE UP

## 2020-12-23 PROBLEM — Z01.419 ENCOUNTER FOR GYNECOLOGICAL EXAMINATION WITHOUT ABNORMAL FINDING: Status: RESOLVED | Noted: 2020-06-05 | Resolved: 2020-12-23

## 2020-12-23 LAB — SURGICAL PATHOLOGY STUDY: SIGNIFICANT CHANGE UP

## 2021-01-06 ENCOUNTER — APPOINTMENT (OUTPATIENT)
Dept: GYNECOLOGIC ONCOLOGY | Facility: CLINIC | Age: 41
End: 2021-01-06
Payer: COMMERCIAL

## 2021-01-06 VITALS
WEIGHT: 210 LBS | HEART RATE: 65 BPM | HEIGHT: 63 IN | SYSTOLIC BLOOD PRESSURE: 156 MMHG | DIASTOLIC BLOOD PRESSURE: 114 MMHG | BODY MASS INDEX: 37.21 KG/M2 | TEMPERATURE: 97.3 F

## 2021-01-06 PROCEDURE — 99024 POSTOP FOLLOW-UP VISIT: CPT

## 2021-01-22 ENCOUNTER — APPOINTMENT (OUTPATIENT)
Dept: FAMILY MEDICINE | Facility: CLINIC | Age: 41
End: 2021-01-22
Payer: COMMERCIAL

## 2021-01-22 VITALS
SYSTOLIC BLOOD PRESSURE: 150 MMHG | DIASTOLIC BLOOD PRESSURE: 85 MMHG | TEMPERATURE: 97.3 F | WEIGHT: 210 LBS | BODY MASS INDEX: 37.21 KG/M2 | HEART RATE: 68 BPM | HEIGHT: 63 IN | OXYGEN SATURATION: 99 %

## 2021-01-22 VITALS — SYSTOLIC BLOOD PRESSURE: 144 MMHG | DIASTOLIC BLOOD PRESSURE: 96 MMHG

## 2021-01-22 DIAGNOSIS — Z86.19 PERSONAL HISTORY OF OTHER INFECTIOUS AND PARASITIC DISEASES: ICD-10-CM

## 2021-01-22 DIAGNOSIS — Z11.3 ENCOUNTER FOR SCREENING FOR INFECTIONS WITH A PREDOMINANTLY SEXUAL MODE OF TRANSMISSION: ICD-10-CM

## 2021-01-22 DIAGNOSIS — Z82.49 FAMILY HISTORY OF ISCHEMIC HEART DISEASE AND OTHER DISEASES OF THE CIRCULATORY SYSTEM: ICD-10-CM

## 2021-01-22 PROCEDURE — 99396 PREV VISIT EST AGE 40-64: CPT | Mod: 25

## 2021-01-22 PROCEDURE — 99072 ADDL SUPL MATRL&STAF TM PHE: CPT

## 2021-01-22 PROCEDURE — 36415 COLL VENOUS BLD VENIPUNCTURE: CPT

## 2021-01-22 RX ORDER — TRIAMCINOLONE ACETONIDE 1 MG/G
0.1 OINTMENT TOPICAL
Qty: 1 | Refills: 3 | Status: DISCONTINUED | COMMUNITY
Start: 2018-03-16 | End: 2021-01-22

## 2021-01-22 NOTE — PHYSICAL EXAM
[Normal Oropharynx] : the oropharynx was normal [Normal Sclera/Conjunctiva] : normal sclera/conjunctiva [No Edema] : there was no peripheral edema [No Extremity Clubbing/Cyanosis] : no extremity clubbing/cyanosis [Normal] : affect was normal and insight and judgment were intact [No Lymphadenopathy] : no lymphadenopathy [de-identified] : cerumen right > left

## 2021-01-22 NOTE — HISTORY OF PRESENT ILLNESS
[FreeTextEntry1] : Ms. SAM presents for annual physical.\par  [de-identified] : Ms. SAM presents for annual physical.\par \par Takes Marshmellow Root-digestion, Milk Thistle, stopped Mushroom Extract, Elderberry.\par \par Hysterectomy 5 weeks ago, has ovaries, had hyperplasia.  Family history of uterine cancer.   Feeling much better.\par \par Needed valtrex in the past.\par History of ongoing hypertension. Reports it has been maybe 5 years on and off with elevated BP readings.\par Medications and allergies reviewed.\par

## 2021-01-22 NOTE — HEALTH RISK ASSESSMENT
[Yes] : Yes [2 - 4 times a month (2 pts)] : 2-4 times a month (2 points) [With Significant Other] : lives with significant other [# of Members in Household ___] :  household currently consist of [unfilled] member(s) [Employed] : employed [] :  [# Of Children ___] : has [unfilled] children [] : No [de-identified] : 20 years pack history [YearQuit] : 2014 [de-identified] : Started back at gym [de-identified] : Poultry, Fish, veggies [Reports changes in vision] : Reports no changes in vision [Reports changes in dental health] : Reports no changes in dental health [FreeTextEntry2] : [facility] [de-identified] : Eyeglasses 1 year ago-eye exam upcoming [de-identified] : Recent dentist-gingivitis

## 2021-01-22 NOTE — PLAN
[FreeTextEntry1] : Will follow up labwork drawn in office today.\par \par Discussed blood pressure monitoring and normal ranges for BP.  Keep BP log.  Advised elevated readings or symptoms require immediate evaluation. \par Advised patient if they have a home blood pressure cuff, they may bring it with them to their next visit and compare machine readings.  \par Avoid added salt in diet.  Start low dose amlodipine; return to office in 2-3 weeks for recheck. \par \par Advised patient to start Debrox/Carbamide peroxide drops 3-4 days prior to returning to office for ear wax irrigation of impacted cerumen. \par \par HM-up to date Gyn.\par \par Will adjust meds based on labs. \par Patient expressed understanding of plan.\par \par

## 2021-01-22 NOTE — REVIEW OF SYSTEMS
[Negative] : Genitourinary [Headache] : no headache [Dizziness] : no dizziness [de-identified] : 8 hours of sleep per night; mood doing well.

## 2021-01-25 LAB
25(OH)D3 SERPL-MCNC: 23.9 NG/ML
ALBUMIN SERPL ELPH-MCNC: 4.4 G/DL
ALP BLD-CCNC: 84 U/L
ALT SERPL-CCNC: 19 U/L
ANION GAP SERPL CALC-SCNC: 13 MMOL/L
AST SERPL-CCNC: 18 U/L
BASOPHILS # BLD AUTO: 0.04 K/UL
BASOPHILS NFR BLD AUTO: 0.4 %
BILIRUB SERPL-MCNC: 0.2 MG/DL
BUN SERPL-MCNC: 9 MG/DL
C TRACH RRNA SPEC QL NAA+PROBE: NOT DETECTED
CALCIUM SERPL-MCNC: 9.1 MG/DL
CHLORIDE SERPL-SCNC: 102 MMOL/L
CHOLEST SERPL-MCNC: 175 MG/DL
CO2 SERPL-SCNC: 21 MMOL/L
CREAT SERPL-MCNC: 0.73 MG/DL
EOSINOPHIL # BLD AUTO: 0.15 K/UL
EOSINOPHIL NFR BLD AUTO: 1.5 %
ESTIMATED AVERAGE GLUCOSE: 103 MG/DL
GLUCOSE SERPL-MCNC: 93 MG/DL
HBA1C MFR BLD HPLC: 5.2 %
HCT VFR BLD CALC: 38 %
HDLC SERPL-MCNC: 65 MG/DL
HGB BLD-MCNC: 12 G/DL
HIV1+2 AB SPEC QL IA.RAPID: NONREACTIVE
IMM GRANULOCYTES NFR BLD AUTO: 0.3 %
LDLC SERPL CALC-MCNC: 76 MG/DL
LYMPHOCYTES # BLD AUTO: 1.93 K/UL
LYMPHOCYTES NFR BLD AUTO: 18.8 %
MAN DIFF?: NORMAL
MCHC RBC-ENTMCNC: 26.9 PG
MCHC RBC-ENTMCNC: 31.6 GM/DL
MCV RBC AUTO: 85.2 FL
MONOCYTES # BLD AUTO: 0.48 K/UL
MONOCYTES NFR BLD AUTO: 4.7 %
N GONORRHOEA RRNA SPEC QL NAA+PROBE: NOT DETECTED
NEUTROPHILS # BLD AUTO: 7.61 K/UL
NEUTROPHILS NFR BLD AUTO: 74.3 %
NONHDLC SERPL-MCNC: 110 MG/DL
PLATELET # BLD AUTO: 301 K/UL
POTASSIUM SERPL-SCNC: 4.4 MMOL/L
PROT SERPL-MCNC: 7.6 G/DL
RBC # BLD: 4.46 M/UL
RBC # FLD: 13.2 %
SODIUM SERPL-SCNC: 136 MMOL/L
SOURCE AMPLIFICATION: NORMAL
T PALLIDUM AB SER QL IA: NEGATIVE
TRIGL SERPL-MCNC: 166 MG/DL
TSH SERPL-ACNC: 1.06 UIU/ML
WBC # FLD AUTO: 10.24 K/UL

## 2021-02-12 ENCOUNTER — APPOINTMENT (OUTPATIENT)
Dept: FAMILY MEDICINE | Facility: CLINIC | Age: 41
End: 2021-02-12
Payer: COMMERCIAL

## 2021-02-12 VITALS — SYSTOLIC BLOOD PRESSURE: 124 MMHG | DIASTOLIC BLOOD PRESSURE: 84 MMHG

## 2021-02-12 VITALS
WEIGHT: 211 LBS | TEMPERATURE: 98.1 F | HEART RATE: 70 BPM | OXYGEN SATURATION: 98 % | BODY MASS INDEX: 37.39 KG/M2 | DIASTOLIC BLOOD PRESSURE: 84 MMHG | HEIGHT: 63 IN | SYSTOLIC BLOOD PRESSURE: 122 MMHG

## 2021-02-12 DIAGNOSIS — R23.8 OTHER SKIN CHANGES: ICD-10-CM

## 2021-02-12 PROCEDURE — 99213 OFFICE O/P EST LOW 20 MIN: CPT

## 2021-02-12 PROCEDURE — 99072 ADDL SUPL MATRL&STAF TM PHE: CPT

## 2021-02-12 NOTE — HISTORY OF PRESENT ILLNESS
[FreeTextEntry1] : Here for BP follow-up.\par  [de-identified] : Here for BP follow-up.\par \par Had skin irritation near lip, possible fever blister.  Patient put tea tree oil on it and it seemed to get worse. Now has been putting Vaseline on chin area. \par \par Seeing Oncology next week for follow up. \par \par Has been checking BPs in morning has been ranging 125-145, DBP 80-mid 90s.  Did not bring home cuff with her. \par Taking amlodipine in AM.\par \par \par Medications and allergies reviewed.\par  \par \par

## 2021-02-12 NOTE — PHYSICAL EXAM
[Normal Oropharynx] : the oropharynx was normal [No Edema] : there was no peripheral edema [No Extremity Clubbing/Cyanosis] : no extremity clubbing/cyanosis [Normal] : affect was normal and insight and judgment were intact [de-identified] : right inferior lip crusted lesion

## 2021-02-12 NOTE — PLAN
[FreeTextEntry1] : HTN-BP in range. \par Discussed blood pressure monitoring and normal ranges for BP.  Keep BP log.  Advised elevated readings or symptoms require immediate evaluation. \par Advised patient if they have a home blood pressure cuff, they may bring it with them to their next visit and compare machine readings.  \par Avoid added salt in diet.\par Will follow-up in 2-3 months-sooner if elevated readings. \par \par Skin irritation-patient advised to apply small amounts of bacitracin or triple antibiotic ointment to area. \par Continue to monitor.  If not improving needs re-evaluation.\par Patient expressed understanding.\par

## 2021-02-16 PROBLEM — Z48.89 POSTOPERATIVE VISIT: Status: ACTIVE | Noted: 2021-01-03

## 2021-02-17 ENCOUNTER — APPOINTMENT (OUTPATIENT)
Dept: GYNECOLOGIC ONCOLOGY | Facility: CLINIC | Age: 41
End: 2021-02-17
Payer: COMMERCIAL

## 2021-02-17 VITALS — HEART RATE: 74 BPM | SYSTOLIC BLOOD PRESSURE: 149 MMHG | TEMPERATURE: 97.7 F | DIASTOLIC BLOOD PRESSURE: 92 MMHG

## 2021-02-17 DIAGNOSIS — Z48.89 ENCOUNTER FOR OTHER SPECIFIED SURGICAL AFTERCARE: ICD-10-CM

## 2021-02-17 PROCEDURE — 99024 POSTOP FOLLOW-UP VISIT: CPT

## 2021-07-15 ENCOUNTER — FORM ENCOUNTER (OUTPATIENT)
Age: 41
End: 2021-07-15

## 2022-01-11 ENCOUNTER — APPOINTMENT (OUTPATIENT)
Dept: DERMATOLOGY | Facility: CLINIC | Age: 42
End: 2022-01-11

## 2022-01-30 ENCOUNTER — APPOINTMENT (OUTPATIENT)
Dept: OBGYN | Facility: CLINIC | Age: 42
End: 2022-01-30

## 2022-02-12 ENCOUNTER — LABORATORY RESULT (OUTPATIENT)
Age: 42
End: 2022-02-12

## 2022-02-12 ENCOUNTER — APPOINTMENT (OUTPATIENT)
Dept: FAMILY MEDICINE | Facility: CLINIC | Age: 42
End: 2022-02-12
Payer: COMMERCIAL

## 2022-02-12 ENCOUNTER — NON-APPOINTMENT (OUTPATIENT)
Age: 42
End: 2022-02-12

## 2022-02-12 VITALS
TEMPERATURE: 97 F | SYSTOLIC BLOOD PRESSURE: 146 MMHG | DIASTOLIC BLOOD PRESSURE: 89 MMHG | BODY MASS INDEX: 36.32 KG/M2 | OXYGEN SATURATION: 99 % | WEIGHT: 205 LBS | HEART RATE: 77 BPM | HEIGHT: 63 IN | RESPIRATION RATE: 18 BRPM

## 2022-02-12 DIAGNOSIS — H61.23 IMPACTED CERUMEN, BILATERAL: ICD-10-CM

## 2022-02-12 DIAGNOSIS — Z76.89 PERSONS ENCOUNTERING HEALTH SERVICES IN OTHER SPECIFIED CIRCUMSTANCES: ICD-10-CM

## 2022-02-12 DIAGNOSIS — N85.02 ENDOMETRIAL INTRAEPITHELIAL NEOPLASIA [EIN]: ICD-10-CM

## 2022-02-12 DIAGNOSIS — Z82.3 FAMILY HISTORY OF STROKE: ICD-10-CM

## 2022-02-12 PROCEDURE — 36415 COLL VENOUS BLD VENIPUNCTURE: CPT

## 2022-02-12 PROCEDURE — 99204 OFFICE O/P NEW MOD 45 MIN: CPT | Mod: 25

## 2022-02-12 PROCEDURE — 96160 PT-FOCUSED HLTH RISK ASSMT: CPT | Mod: 59

## 2022-02-12 PROCEDURE — G0444 DEPRESSION SCREEN ANNUAL: CPT | Mod: 59

## 2022-02-12 RX ORDER — AMLODIPINE BESYLATE 2.5 MG/1
2.5 TABLET ORAL DAILY
Qty: 90 | Refills: 0 | Status: DISCONTINUED | COMMUNITY
Start: 2021-01-22 | End: 2022-02-12

## 2022-02-14 LAB
25(OH)D3 SERPL-MCNC: 18.9 NG/ML
ALBUMIN SERPL ELPH-MCNC: 4.3 G/DL
ALP BLD-CCNC: 96 U/L
ALT SERPL-CCNC: 16 U/L
ANION GAP SERPL CALC-SCNC: 13 MMOL/L
APPEARANCE: ABNORMAL
AST SERPL-CCNC: 15 U/L
BASOPHILS # BLD AUTO: 0.03 K/UL
BASOPHILS NFR BLD AUTO: 0.3 %
BILIRUB SERPL-MCNC: 0.4 MG/DL
BILIRUBIN URINE: ABNORMAL
BLOOD URINE: NEGATIVE
BUN SERPL-MCNC: 9 MG/DL
CALCIUM SERPL-MCNC: 8.9 MG/DL
CHLORIDE SERPL-SCNC: 103 MMOL/L
CHOLEST SERPL-MCNC: 143 MG/DL
CO2 SERPL-SCNC: 22 MMOL/L
COLOR: ABNORMAL
CREAT SERPL-MCNC: 0.65 MG/DL
EOSINOPHIL # BLD AUTO: 0.21 K/UL
EOSINOPHIL NFR BLD AUTO: 2.1 %
ESTIMATED AVERAGE GLUCOSE: 111 MG/DL
FERRITIN SERPL-MCNC: 69 NG/ML
FOLATE SERPL-MCNC: 3.1 NG/ML
GLUCOSE QUALITATIVE U: NEGATIVE
GLUCOSE SERPL-MCNC: 98 MG/DL
HBA1C MFR BLD HPLC: 5.5 %
HCT VFR BLD CALC: 39.6 %
HDLC SERPL-MCNC: 72 MG/DL
HGB BLD-MCNC: 12.9 G/DL
IMM GRANULOCYTES NFR BLD AUTO: 0.4 %
KETONES URINE: NORMAL
LDLC SERPL CALC-MCNC: 56 MG/DL
LEUKOCYTE ESTERASE URINE: NEGATIVE
LYMPHOCYTES # BLD AUTO: 1.39 K/UL
LYMPHOCYTES NFR BLD AUTO: 13.6 %
MAN DIFF?: NORMAL
MCHC RBC-ENTMCNC: 28.2 PG
MCHC RBC-ENTMCNC: 32.6 GM/DL
MCV RBC AUTO: 86.5 FL
MONOCYTES # BLD AUTO: 0.54 K/UL
MONOCYTES NFR BLD AUTO: 5.3 %
NEUTROPHILS # BLD AUTO: 7.98 K/UL
NEUTROPHILS NFR BLD AUTO: 78.3 %
NITRITE URINE: NEGATIVE
NONHDLC SERPL-MCNC: 70 MG/DL
PH URINE: 5.5
PLATELET # BLD AUTO: 341 K/UL
POTASSIUM SERPL-SCNC: 4.4 MMOL/L
PROT SERPL-MCNC: 7.1 G/DL
PROTEIN URINE: NEGATIVE
RBC # BLD: 4.58 M/UL
RBC # FLD: 13.8 %
SODIUM SERPL-SCNC: 138 MMOL/L
SPECIFIC GRAVITY URINE: >=1.03
TRIGL SERPL-MCNC: 74 MG/DL
TSH SERPL-ACNC: 0.99 UIU/ML
TTG IGA SER IA-ACNC: <1.2 U/ML
TTG IGA SER-ACNC: NEGATIVE
TTG IGG SER IA-ACNC: 3.9 U/ML
TTG IGG SER IA-ACNC: NEGATIVE
UROBILINOGEN URINE: NORMAL
VIT B12 SERPL-MCNC: 275 PG/ML
WBC # FLD AUTO: 10.19 K/UL

## 2022-02-18 LAB
CLAM IGE QN: <0.1 KUA/L
CODFISH IGE QN: <0.1 KUA/L
CORN IGE QN: <0.1 KUA/L
COW MILK IGE QN: 0.99 KUA/L
DEPRECATED CLAM IGE RAST QL: 0
DEPRECATED CODFISH IGE RAST QL: 0
DEPRECATED CORN IGE RAST QL: 0
DEPRECATED COW MILK IGE RAST QL: 2
DEPRECATED EGG WHITE IGE RAST QL: NORMAL
DEPRECATED GLUTEN IGE RAST QL: <0.1 KUA/L
DEPRECATED PEANUT IGE RAST QL: NORMAL
DEPRECATED SCALLOP IGE RAST QL: <0.1 KUA/L
DEPRECATED SESAME SEED IGE RAST QL: 0
DEPRECATED SHRIMP IGE RAST QL: NORMAL
DEPRECATED SOYBEAN IGE RAST QL: 0
DEPRECATED WALNUT IGE RAST QL: 0
DEPRECATED WHEAT IGE RAST QL: 0
EGG WHITE IGE QN: 0.16 KUA/L
GLUTEN IGG QN: 0
PEANUT IGE QN: 0.17 KUA/L
SCALLOP IGE QN: 0
SCALLOP IGE QN: 0.2 KUA/L
SESAME SEED IGE QN: <0.1 KUA/L
SOYBEAN IGE QN: <0.1 KUA/L
TOTAL IGE SMQN RAST: 1945 KU/L
WALNUT IGE QN: <0.1 KUA/L
WHEAT IGE QN: <0.1 KUA/L

## 2022-02-28 ENCOUNTER — TRANSCRIPTION ENCOUNTER (OUTPATIENT)
Age: 42
End: 2022-02-28

## 2022-03-06 ENCOUNTER — NON-APPOINTMENT (OUTPATIENT)
Age: 42
End: 2022-03-06

## 2022-03-06 ENCOUNTER — APPOINTMENT (OUTPATIENT)
Dept: OBGYN | Facility: CLINIC | Age: 42
End: 2022-03-06
Payer: COMMERCIAL

## 2022-03-06 VITALS
HEIGHT: 63 IN | DIASTOLIC BLOOD PRESSURE: 87 MMHG | WEIGHT: 213 LBS | BODY MASS INDEX: 37.74 KG/M2 | SYSTOLIC BLOOD PRESSURE: 139 MMHG | HEART RATE: 73 BPM

## 2022-03-06 DIAGNOSIS — Z01.419 ENCOUNTER FOR GYNECOLOGICAL EXAMINATION (GENERAL) (ROUTINE) W/OUT ABNORMAL FINDINGS: ICD-10-CM

## 2022-03-06 PROCEDURE — 99396 PREV VISIT EST AGE 40-64: CPT

## 2022-03-07 LAB — HPV HIGH+LOW RISK DNA PNL CVX: NOT DETECTED

## 2022-03-11 LAB — CYTOLOGY CVX/VAG DOC THIN PREP: ABNORMAL

## 2022-04-01 NOTE — ASU PATIENT PROFILE, ADULT - URINARY CATHETER
Spoke with patient regarding screening mammogram results. Results negative. Radiologist recommends follow up in one year for a screening mammogram. Patient verbalized understanding.
no

## 2022-04-09 ENCOUNTER — NON-APPOINTMENT (OUTPATIENT)
Age: 42
End: 2022-04-09

## 2022-04-09 ENCOUNTER — APPOINTMENT (OUTPATIENT)
Dept: FAMILY MEDICINE | Facility: CLINIC | Age: 42
End: 2022-04-09
Payer: COMMERCIAL

## 2022-04-09 VITALS
TEMPERATURE: 97.2 F | DIASTOLIC BLOOD PRESSURE: 89 MMHG | HEIGHT: 63 IN | BODY MASS INDEX: 36.68 KG/M2 | HEART RATE: 68 BPM | OXYGEN SATURATION: 94 % | SYSTOLIC BLOOD PRESSURE: 137 MMHG | WEIGHT: 207 LBS

## 2022-04-09 DIAGNOSIS — R94.31 ABNORMAL ELECTROCARDIOGRAM [ECG] [EKG]: ICD-10-CM

## 2022-04-09 DIAGNOSIS — Z23 ENCOUNTER FOR IMMUNIZATION: ICD-10-CM

## 2022-04-09 PROCEDURE — 93000 ELECTROCARDIOGRAM COMPLETE: CPT | Mod: 59

## 2022-04-09 PROCEDURE — 99396 PREV VISIT EST AGE 40-64: CPT | Mod: 25

## 2022-11-13 ENCOUNTER — NON-APPOINTMENT (OUTPATIENT)
Age: 42
End: 2022-11-13

## 2022-12-30 ENCOUNTER — NON-APPOINTMENT (OUTPATIENT)
Age: 42
End: 2022-12-30

## 2023-01-06 ENCOUNTER — APPOINTMENT (OUTPATIENT)
Dept: FAMILY MEDICINE | Facility: CLINIC | Age: 43
End: 2023-01-06
Payer: COMMERCIAL

## 2023-01-06 ENCOUNTER — LABORATORY RESULT (OUTPATIENT)
Age: 43
End: 2023-01-06

## 2023-01-06 VITALS
HEIGHT: 63 IN | DIASTOLIC BLOOD PRESSURE: 95 MMHG | TEMPERATURE: 97 F | SYSTOLIC BLOOD PRESSURE: 153 MMHG | HEART RATE: 65 BPM | OXYGEN SATURATION: 98 % | RESPIRATION RATE: 18 BRPM

## 2023-01-06 PROCEDURE — 36415 COLL VENOUS BLD VENIPUNCTURE: CPT

## 2023-01-06 PROCEDURE — 99214 OFFICE O/P EST MOD 30 MIN: CPT | Mod: 25

## 2023-01-07 LAB
ALBUMIN SERPL ELPH-MCNC: 4.3 G/DL
ALP BLD-CCNC: 87 U/L
ALT SERPL-CCNC: 25 U/L
ANION GAP SERPL CALC-SCNC: 11 MMOL/L
APPEARANCE: ABNORMAL
AST SERPL-CCNC: 17 U/L
BASOPHILS # BLD AUTO: 0.02 K/UL
BASOPHILS NFR BLD AUTO: 0.3 %
BILIRUB SERPL-MCNC: 0.3 MG/DL
BILIRUBIN URINE: NEGATIVE
BLOOD URINE: ABNORMAL
BUN SERPL-MCNC: 13 MG/DL
CALCIUM SERPL-MCNC: 9.4 MG/DL
CHLORIDE SERPL-SCNC: 104 MMOL/L
CHOLEST SERPL-MCNC: 162 MG/DL
CO2 SERPL-SCNC: 24 MMOL/L
COLOR: ABNORMAL
CREAT SERPL-MCNC: 0.63 MG/DL
EGFR: 114 ML/MIN/1.73M2
EOSINOPHIL # BLD AUTO: 0.13 K/UL
EOSINOPHIL NFR BLD AUTO: 2.1 %
ESTIMATED AVERAGE GLUCOSE: 114 MG/DL
FERRITIN SERPL-MCNC: 103 NG/ML
FOLATE SERPL-MCNC: 4.2 NG/ML
GLUCOSE QUALITATIVE U: NEGATIVE
GLUCOSE SERPL-MCNC: 97 MG/DL
HBA1C MFR BLD HPLC: 5.6 %
HCT VFR BLD CALC: 37.5 %
HDLC SERPL-MCNC: 57 MG/DL
HGB BLD-MCNC: 12.3 G/DL
IMM GRANULOCYTES NFR BLD AUTO: 0.5 %
KETONES URINE: NEGATIVE
LDLC SERPL CALC-MCNC: 83 MG/DL
LEUKOCYTE ESTERASE URINE: NEGATIVE
LYMPHOCYTES # BLD AUTO: 1.6 K/UL
LYMPHOCYTES NFR BLD AUTO: 25.4 %
MAN DIFF?: NORMAL
MCHC RBC-ENTMCNC: 27 PG
MCHC RBC-ENTMCNC: 32.8 GM/DL
MCV RBC AUTO: 82.4 FL
MONOCYTES # BLD AUTO: 0.44 K/UL
MONOCYTES NFR BLD AUTO: 7 %
NEUTROPHILS # BLD AUTO: 4.09 K/UL
NEUTROPHILS NFR BLD AUTO: 64.7 %
NITRITE URINE: NEGATIVE
NONHDLC SERPL-MCNC: 105 MG/DL
PH URINE: 5.5
PLATELET # BLD AUTO: 309 K/UL
POTASSIUM SERPL-SCNC: 4.5 MMOL/L
PROT SERPL-MCNC: 7.5 G/DL
PROTEIN URINE: NORMAL
RBC # BLD: 4.55 M/UL
RBC # FLD: 13 %
SODIUM SERPL-SCNC: 139 MMOL/L
SPECIFIC GRAVITY URINE: 1.03
TRIGL SERPL-MCNC: 111 MG/DL
TSH SERPL-ACNC: 1.33 UIU/ML
UROBILINOGEN URINE: NORMAL
VIT B12 SERPL-MCNC: 271 PG/ML
WBC # FLD AUTO: 6.31 K/UL

## 2023-01-09 ENCOUNTER — APPOINTMENT (OUTPATIENT)
Dept: DERMATOLOGY | Facility: CLINIC | Age: 43
End: 2023-01-09
Payer: COMMERCIAL

## 2023-01-09 PROCEDURE — 99203 OFFICE O/P NEW LOW 30 MIN: CPT

## 2023-01-12 LAB
CLAM IGE QN: 0.15 KUA/L
CODFISH IGE QN: <0.1 KUA/L
CORN IGE QN: 0.11 KUA/L
COW MILK IGE QN: 0.59 KUA/L
DEPRECATED CLAM IGE RAST QL: NORMAL
DEPRECATED CODFISH IGE RAST QL: 0
DEPRECATED CORN IGE RAST QL: NORMAL
DEPRECATED COW MILK IGE RAST QL: 1
DEPRECATED EGG WHITE IGE RAST QL: 0
DEPRECATED GLUTEN IGE RAST QL: <0.1 KUA/L
DEPRECATED PEANUT IGE RAST QL: NORMAL
DEPRECATED SCALLOP IGE RAST QL: <0.1 KUA/L
DEPRECATED SESAME SEED IGE RAST QL: NORMAL
DEPRECATED SHRIMP IGE RAST QL: NORMAL
DEPRECATED SOYBEAN IGE RAST QL: NORMAL
DEPRECATED WALNUT IGE RAST QL: NORMAL
DEPRECATED WHEAT IGE RAST QL: 0
EGG WHITE IGE QN: <0.1 KUA/L
GLUTEN IGG QN: 0
PEANUT IGE QN: 0.15 KUA/L
SCALLOP IGE QN: 0
SCALLOP IGE QN: 0.31 KUA/L
SESAME SEED IGE QN: 0.21 KUA/L
SOYBEAN IGE QN: 0.17 KUA/L
WALNUT IGE QN: 0.11 KUA/L
WHEAT IGE QN: <0.1 KUA/L

## 2023-01-14 ENCOUNTER — NON-APPOINTMENT (OUTPATIENT)
Age: 43
End: 2023-01-14

## 2023-02-27 ENCOUNTER — APPOINTMENT (OUTPATIENT)
Dept: FAMILY MEDICINE | Facility: CLINIC | Age: 43
End: 2023-02-27
Payer: COMMERCIAL

## 2023-02-27 VITALS
HEIGHT: 63 IN | HEART RATE: 60 BPM | SYSTOLIC BLOOD PRESSURE: 121 MMHG | BODY MASS INDEX: 37.56 KG/M2 | RESPIRATION RATE: 18 BRPM | TEMPERATURE: 97 F | DIASTOLIC BLOOD PRESSURE: 81 MMHG | OXYGEN SATURATION: 98 % | WEIGHT: 212 LBS

## 2023-02-27 PROCEDURE — 99214 OFFICE O/P EST MOD 30 MIN: CPT

## 2023-03-06 ENCOUNTER — APPOINTMENT (OUTPATIENT)
Dept: GASTROENTEROLOGY | Facility: CLINIC | Age: 43
End: 2023-03-06
Payer: COMMERCIAL

## 2023-03-06 DIAGNOSIS — D64.9 ANEMIA, UNSPECIFIED: ICD-10-CM

## 2023-03-06 PROCEDURE — 99204 OFFICE O/P NEW MOD 45 MIN: CPT

## 2023-03-09 NOTE — HISTORY OF PRESENT ILLNESS
[FreeTextEntry1] : 42 F presenting foe eval of anemia. She reports she is following with hematology for ?G6PD deficiency, and was referred to GI for EGD/Colon evaluation. She reports in January she had "stomach bug" where she had a few days of nausea and vomiting, dysphagia. Did have some blood streaked vomit. Nausea, and vomiting resolved on own, however she continues to have frequent gnawing epigastric pain and heartburn. Will have occasional constipation that resolves on own. At this time denies chest pain, shortness of breath, diarrhea, melena, hematochezia, unintentional weight changes, fevers, chills.

## 2023-03-09 NOTE — ASSESSMENT
[FreeTextEntry1] : 42 F presenting foe eval of anemia. She reports she is following with hematology for ?G6PD deficiency, and was referred to GI for EGD/Colon evaluation. Has been having heartburn/epigastric pain. ?gastritis vs ?PUD for cause of anemia. Will start PPI daily. Educated on acid reducing diet. Will plan for EGD/colonoscopy. Discussed with patient prep, procedure, and risks such as missed lesions/polyps, bleeding, and perforation of the bowel. Verbalized understanding. Miralax prep. WIll consider capsule study if above negative. Discussed with Dr. Arrieta. \par \par \par

## 2023-03-09 NOTE — PHYSICAL EXAM
[Bowel Sounds] : normal bowel sounds [No Masses] : no abdominal mass palpated [Abdomen Soft] : soft [] : no hepatosplenomegaly [Normal] : oriented to person, place, and time [de-identified] : LUQ abdominal tenderness

## 2023-05-02 ENCOUNTER — RESULT REVIEW (OUTPATIENT)
Age: 43
End: 2023-05-02

## 2023-05-02 ENCOUNTER — APPOINTMENT (OUTPATIENT)
Dept: GASTROENTEROLOGY | Facility: AMBULATORY MEDICAL SERVICES | Age: 43
End: 2023-05-02
Payer: COMMERCIAL

## 2023-05-02 PROCEDURE — 45378 DIAGNOSTIC COLONOSCOPY: CPT | Mod: 59

## 2023-05-02 PROCEDURE — 43239 EGD BIOPSY SINGLE/MULTIPLE: CPT

## 2023-05-05 ENCOUNTER — NON-APPOINTMENT (OUTPATIENT)
Age: 43
End: 2023-05-05

## 2023-05-08 ENCOUNTER — RESULT REVIEW (OUTPATIENT)
Age: 43
End: 2023-05-08

## 2023-05-08 ENCOUNTER — NON-APPOINTMENT (OUTPATIENT)
Age: 43
End: 2023-05-08

## 2023-05-08 ENCOUNTER — OUTPATIENT (OUTPATIENT)
Dept: OUTPATIENT SERVICES | Facility: HOSPITAL | Age: 43
LOS: 1 days | End: 2023-05-08
Payer: COMMERCIAL

## 2023-05-08 DIAGNOSIS — R10.13 EPIGASTRIC PAIN: ICD-10-CM

## 2023-05-08 DIAGNOSIS — Z90.49 ACQUIRED ABSENCE OF OTHER SPECIFIED PARTS OF DIGESTIVE TRACT: Chronic | ICD-10-CM

## 2023-05-08 DIAGNOSIS — Z98.84 BARIATRIC SURGERY STATUS: Chronic | ICD-10-CM

## 2023-05-08 PROCEDURE — 74220 X-RAY XM ESOPHAGUS 1CNTRST: CPT

## 2023-05-08 PROCEDURE — 74220 X-RAY XM ESOPHAGUS 1CNTRST: CPT | Mod: 26

## 2023-05-09 DIAGNOSIS — R10.13 EPIGASTRIC PAIN: ICD-10-CM

## 2023-05-15 ENCOUNTER — NON-APPOINTMENT (OUTPATIENT)
Age: 43
End: 2023-05-15

## 2023-05-25 ENCOUNTER — APPOINTMENT (OUTPATIENT)
Dept: BARIATRICS | Facility: CLINIC | Age: 43
End: 2023-05-25
Payer: COMMERCIAL

## 2023-05-25 ENCOUNTER — NON-APPOINTMENT (OUTPATIENT)
Age: 43
End: 2023-05-25

## 2023-05-25 VITALS
OXYGEN SATURATION: 99 % | HEIGHT: 63 IN | TEMPERATURE: 96.7 F | WEIGHT: 210 LBS | HEART RATE: 67 BPM | DIASTOLIC BLOOD PRESSURE: 76 MMHG | BODY MASS INDEX: 37.21 KG/M2 | SYSTOLIC BLOOD PRESSURE: 128 MMHG

## 2023-05-25 DIAGNOSIS — R10.13 EPIGASTRIC PAIN: ICD-10-CM

## 2023-05-25 DIAGNOSIS — R12 HEARTBURN: ICD-10-CM

## 2023-05-25 PROCEDURE — 99204 OFFICE O/P NEW MOD 45 MIN: CPT | Mod: 25

## 2023-05-25 PROCEDURE — S2083 ADJUSTMENT GASTRIC BAND: CPT

## 2023-05-25 RX ORDER — OMEPRAZOLE 40 MG/1
40 CAPSULE, DELAYED RELEASE ORAL
Qty: 30 | Refills: 3 | Status: DISCONTINUED | COMMUNITY
Start: 2023-03-06 | End: 2023-05-25

## 2023-05-25 RX ORDER — LISINOPRIL 5 MG/1
5 TABLET ORAL
Qty: 90 | Refills: 0 | Status: DISCONTINUED | COMMUNITY
Start: 2023-01-06 | End: 2023-05-25

## 2023-05-25 NOTE — ASSESSMENT
[FreeTextEntry1] : Pt last seen in practice almost 16 yrs ago.  Band placed in 2004\par Little to no post operative Lap Band follow up.  Prior records reviewed which indicate 2.3 cc in band.\par \par Seen by GI in workup for anemia and referred for band assessment given symptoms of reflux.\par \par VE performed on 5/8 personally reviewed which show slight horizontal lie of the band with minimal pouch dilation.  Mild tertiary contractions of esophagus with minimal reflux.\par \par To alleviate the symptoms of reflux, Band adjustment has been recommended to deflate and thereby offload the increased resistance of passage from esophagus to stomach.\par \par LAP-BAND adjustment was performed today. Using standard sterile technique, a 20-gauge Cobian needle was inserted into the LAP-BAND port.  This was accessed with single puncture.  2 cc were aspirated and 2 cc removed. The resulting net volume remains 0 cc.\par \par The patient was seated erect and tolerated water test without symptoms of reflux, dysphagia or regurgitation.\par \par Routine post adjustment nutritional counseling was provided. This patient should adhere to a liquid diet for the next 24-48 hours advancing one stage per day to regular as tolerated.\par \par If symptoms persist will have to consider removal of Gastric band and port.\par \par Nutritional and weight loss counseling has been provided. The importance of weight reduction in the treatment of Obesity and its contribution to resolution of obesity related comorbidities has been discussed.  The patient is encouraged to remain calorie conscious and continue a low fat, low carbohydrate, high protein diet. Eat slowly and chew food well.  Avoid eating and drinking simultaneously.  Also, emphasis has been placed on the importance of adequate hydration, multi-vitamin supplementation and exercise.  (15 min)\par \par f/u 2 months.\par

## 2023-05-25 NOTE — PHYSICAL EXAM
[Obese, well nourished, in no acute distress] : obese, well nourished, in no acute distress [Normal] : affect appropriate [de-identified] : morbidly obese, soft, nontender, nondistended, positive bowel sounds in all 4 quadrants. No evidence of hernia or masses. Surgical incisions were well approximated and healed appropriately without erythema, induration or fluctuance. LAP-BAND port is easily palpable without tenderness.

## 2023-05-25 NOTE — HISTORY OF PRESENT ILLNESS
[de-identified] : History of Lap Band placed 2004.  Has not been seen in our practice since. \par Returns having recently developed symptoms of reflux.  Seen by GI and referred to follow up with regards to her lap band.

## 2023-06-16 ENCOUNTER — LABORATORY RESULT (OUTPATIENT)
Age: 43
End: 2023-06-16

## 2023-06-16 ENCOUNTER — NON-APPOINTMENT (OUTPATIENT)
Age: 43
End: 2023-06-16

## 2023-06-16 ENCOUNTER — APPOINTMENT (OUTPATIENT)
Dept: FAMILY MEDICINE | Facility: CLINIC | Age: 43
End: 2023-06-16
Payer: COMMERCIAL

## 2023-06-16 VITALS
HEIGHT: 63 IN | BODY MASS INDEX: 37.21 KG/M2 | WEIGHT: 210 LBS | TEMPERATURE: 97.6 F | SYSTOLIC BLOOD PRESSURE: 145 MMHG | HEART RATE: 74 BPM | DIASTOLIC BLOOD PRESSURE: 85 MMHG | OXYGEN SATURATION: 97 %

## 2023-06-16 DIAGNOSIS — N87.0 MILD CERVICAL DYSPLASIA: ICD-10-CM

## 2023-06-16 DIAGNOSIS — Z00.00 ENCOUNTER FOR GENERAL ADULT MEDICAL EXAMINATION W/OUT ABNORMAL FINDINGS: ICD-10-CM

## 2023-06-16 DIAGNOSIS — Z98.84 BARIATRIC SURGERY STATUS: ICD-10-CM

## 2023-06-16 PROCEDURE — 93000 ELECTROCARDIOGRAM COMPLETE: CPT | Mod: 59

## 2023-06-16 PROCEDURE — 99396 PREV VISIT EST AGE 40-64: CPT | Mod: 25

## 2023-06-16 PROCEDURE — 36415 COLL VENOUS BLD VENIPUNCTURE: CPT

## 2023-06-16 PROCEDURE — 92552 PURE TONE AUDIOMETRY AIR: CPT

## 2023-06-16 PROCEDURE — G0444 DEPRESSION SCREEN ANNUAL: CPT | Mod: 59

## 2023-06-19 DIAGNOSIS — D72.829 ELEVATED WHITE BLOOD CELL COUNT, UNSPECIFIED: ICD-10-CM

## 2023-06-19 LAB
25(OH)D3 SERPL-MCNC: 30.1 NG/ML
ALBUMIN SERPL ELPH-MCNC: 4.5 G/DL
ALP BLD-CCNC: 87 U/L
ALT SERPL-CCNC: 17 U/L
ANION GAP SERPL CALC-SCNC: 10 MMOL/L
APPEARANCE: CLEAR
AST SERPL-CCNC: 21 U/L
BILIRUB SERPL-MCNC: 0.2 MG/DL
BILIRUBIN URINE: NEGATIVE
BLOOD URINE: NEGATIVE
BUN SERPL-MCNC: 12 MG/DL
CALCIUM SERPL-MCNC: 9.8 MG/DL
CHLORIDE SERPL-SCNC: 104 MMOL/L
CHOLEST SERPL-MCNC: 150 MG/DL
CLAM IGE QN: <0.1 KUA/L
CO2 SERPL-SCNC: 26 MMOL/L
CODFISH IGE QN: <0.1 KUA/L
COLOR: YELLOW
CORN IGE QN: <0.1 KUA/L
COW MILK IGE QN: 0.44 KUA/L
CREAT SERPL-MCNC: 0.7 MG/DL
DEPRECATED CLAM IGE RAST QL: 0
DEPRECATED CODFISH IGE RAST QL: 0
DEPRECATED CORN IGE RAST QL: 0
DEPRECATED COW MILK IGE RAST QL: 1
DEPRECATED EGG WHITE IGE RAST QL: 0
DEPRECATED GLUTEN IGE RAST QL: <0.1 KUA/L
DEPRECATED PEANUT IGE RAST QL: NORMAL
DEPRECATED SCALLOP IGE RAST QL: <0.1 KUA/L
DEPRECATED SESAME SEED IGE RAST QL: NORMAL
DEPRECATED SHRIMP IGE RAST QL: NORMAL
DEPRECATED SOYBEAN IGE RAST QL: 0
DEPRECATED WALNUT IGE RAST QL: 0
DEPRECATED WHEAT IGE RAST QL: NORMAL
EGFR: 111 ML/MIN/1.73M2
EGG WHITE IGE QN: <0.1 KUA/L
ESTIMATED AVERAGE GLUCOSE: 111 MG/DL
FERRITIN SERPL-MCNC: 63 NG/ML
FOLATE SERPL-MCNC: 13.6 NG/ML
GLUCOSE QUALITATIVE U: NEGATIVE MG/DL
GLUCOSE SERPL-MCNC: 83 MG/DL
GLUTEN IGG QN: 0
HBA1C MFR BLD HPLC: 5.5 %
HDLC SERPL-MCNC: 64 MG/DL
KETONES URINE: NEGATIVE MG/DL
LDLC SERPL CALC-MCNC: 61 MG/DL
LEUKOCYTE ESTERASE URINE: NEGATIVE
NITRITE URINE: NEGATIVE
NONHDLC SERPL-MCNC: 87 MG/DL
PEANUT IGE QN: 0.12 KUA/L
PH URINE: 5.5
POTASSIUM SERPL-SCNC: 4.5 MMOL/L
PROT SERPL-MCNC: 7.4 G/DL
PROTEIN URINE: NORMAL MG/DL
SCALLOP IGE QN: 0
SCALLOP IGE QN: 0.21 KUA/L
SESAME SEED IGE QN: 0.15 KUA/L
SODIUM SERPL-SCNC: 140 MMOL/L
SOYBEAN IGE QN: <0.1 KUA/L
SPECIFIC GRAVITY URINE: 1.03
TOTAL IGE SMQN RAST: 2977 KU/L
TRIGL SERPL-MCNC: 129 MG/DL
TSH SERPL-ACNC: 1.26 UIU/ML
UROBILINOGEN URINE: 1 MG/DL
VIT B12 SERPL-MCNC: 367 PG/ML
WALNUT IGE QN: <0.1 KUA/L
WHEAT IGE QN: 0.11 KUA/L

## 2023-06-21 LAB
TTG IGA SER IA-ACNC: <1.2 U/ML
TTG IGA SER-ACNC: NEGATIVE
TTG IGG SER IA-ACNC: 3.1 U/ML
TTG IGG SER IA-ACNC: NEGATIVE

## 2023-06-26 LAB
HCT VFR BLD CALC: 37 %
HGB BLD-MCNC: 11.7 G/DL
MCHC RBC-ENTMCNC: 27 PG
MCHC RBC-ENTMCNC: 31.6 GM/DL
MCV RBC AUTO: 85.5 FL
PLATELET # BLD AUTO: 295 K/UL
RBC # BLD: 4.33 M/UL
RBC # FLD: 13.5 %
WBC # FLD AUTO: 9.42 K/UL

## 2023-08-15 ENCOUNTER — APPOINTMENT (OUTPATIENT)
Dept: FAMILY MEDICINE | Facility: CLINIC | Age: 43
End: 2023-08-15
Payer: COMMERCIAL

## 2023-08-15 VITALS
WEIGHT: 205 LBS | DIASTOLIC BLOOD PRESSURE: 70 MMHG | SYSTOLIC BLOOD PRESSURE: 130 MMHG | DIASTOLIC BLOOD PRESSURE: 88 MMHG | RESPIRATION RATE: 18 BRPM | HEIGHT: 63 IN | SYSTOLIC BLOOD PRESSURE: 155 MMHG | BODY MASS INDEX: 36.32 KG/M2 | HEART RATE: 81 BPM | TEMPERATURE: 97.9 F | OXYGEN SATURATION: 97 %

## 2023-08-15 DIAGNOSIS — J20.9 ACUTE BRONCHITIS, UNSPECIFIED: ICD-10-CM

## 2023-08-15 DIAGNOSIS — I10 ESSENTIAL (PRIMARY) HYPERTENSION: ICD-10-CM

## 2023-08-15 PROCEDURE — 99214 OFFICE O/P EST MOD 30 MIN: CPT

## 2023-08-15 RX ORDER — AZITHROMYCIN 250 MG/1
250 TABLET, FILM COATED ORAL
Qty: 1 | Refills: 0 | Status: ACTIVE | COMMUNITY
Start: 2023-08-15 | End: 1900-01-01

## 2023-08-15 RX ORDER — ALBUTEROL SULFATE 90 UG/1
108 (90 BASE) INHALANT RESPIRATORY (INHALATION) EVERY 6 HOURS
Qty: 1 | Refills: 5 | Status: ACTIVE | COMMUNITY
Start: 2023-08-15 | End: 1900-01-01

## 2023-08-15 NOTE — HISTORY OF PRESENT ILLNESS
[FreeTextEntry1] : comes for follow up cough [de-identified] : DANIELLE SAM 42 year yrs old  female presents office comes for follow up Cough x 1 week  Patient states cough for the past 1 week and worse at night and feels like hacking with mucous which is yellow in color  Have been doing kick boxing for 5 weeks  Have been checking her BP daily have  been 120/70s Denies any other complaints. No Fever, Chills, Nausea, Vomiting, Diarrhea, Headache, Chest Pain, Shortness of breath or Abdominal pain.

## 2023-08-15 NOTE — ASSESSMENT
[FreeTextEntry1] : Bronchitis  -will send azithromycin and albuterol INH as needed   HTN BP log normal Exercise: Doing cardiovascular exercise such as running, biking or swimming at least 30 minutes per day most days of the week is recommended to help keep blood pressure healthy.  Lose weight: Maintaining a normal BMI is very important in keeping blood pressure readings normal.  Avoid salt:. Read labels and keep sodium intake to less than 2000 mg per day.  Avoid alcohol: Even 1 or 2 alcoholic drinks can significantly increase blood pressure.  DASH Diet: The DASH diet has been shown to reduce blood pressure.    Complex endometrial hyperplasia /MITCHELL-1  -s/p OBI still have ovaries    Low folate  -continue folic acid    Low vitamin D  -take 2000 IU daily    Obesity  -Loose weight and exercise  -Watch diet  -Lifestyle modifications  -Mediterranean diet  -5 A approach for Counselling  RTO for follow up if not getting better

## 2023-09-28 ENCOUNTER — APPOINTMENT (OUTPATIENT)
Dept: BARIATRICS | Facility: CLINIC | Age: 43
End: 2023-09-28

## 2023-11-03 ENCOUNTER — APPOINTMENT (OUTPATIENT)
Dept: OBGYN | Facility: CLINIC | Age: 43
End: 2023-11-03

## 2023-12-29 ENCOUNTER — APPOINTMENT (OUTPATIENT)
Dept: FAMILY MEDICINE | Facility: CLINIC | Age: 43
End: 2023-12-29
Payer: COMMERCIAL

## 2023-12-29 VITALS
SYSTOLIC BLOOD PRESSURE: 131 MMHG | OXYGEN SATURATION: 97 % | RESPIRATION RATE: 16 BRPM | TEMPERATURE: 97.5 F | HEART RATE: 70 BPM | DIASTOLIC BLOOD PRESSURE: 87 MMHG

## 2023-12-29 DIAGNOSIS — E66.9 OBESITY, UNSPECIFIED: ICD-10-CM

## 2023-12-29 DIAGNOSIS — R79.89 OTHER SPECIFIED ABNORMAL FINDINGS OF BLOOD CHEMISTRY: ICD-10-CM

## 2023-12-29 DIAGNOSIS — E53.8 DEFICIENCY OF OTHER SPECIFIED B GROUP VITAMINS: ICD-10-CM

## 2023-12-29 DIAGNOSIS — Z01.00 ENCOUNTER FOR EXAMINATION OF EYES AND VISION W/OUT ABNORMAL FINDINGS: ICD-10-CM

## 2023-12-29 PROCEDURE — 99213 OFFICE O/P EST LOW 20 MIN: CPT | Mod: 25

## 2023-12-29 PROCEDURE — 99173 VISUAL ACUITY SCREEN: CPT | Mod: 59

## 2024-07-13 ENCOUNTER — APPOINTMENT (OUTPATIENT)
Dept: FAMILY MEDICINE | Facility: CLINIC | Age: 44
End: 2024-07-13

## 2024-08-09 ENCOUNTER — APPOINTMENT (OUTPATIENT)
Dept: OBGYN | Facility: CLINIC | Age: 44
End: 2024-08-09

## 2024-10-04 ENCOUNTER — APPOINTMENT (OUTPATIENT)
Dept: FAMILY MEDICINE | Facility: CLINIC | Age: 44
End: 2024-10-04
Payer: COMMERCIAL

## 2024-10-04 ENCOUNTER — NON-APPOINTMENT (OUTPATIENT)
Age: 44
End: 2024-10-04

## 2024-10-04 VITALS
OXYGEN SATURATION: 98 % | HEART RATE: 73 BPM | DIASTOLIC BLOOD PRESSURE: 90 MMHG | WEIGHT: 185 LBS | SYSTOLIC BLOOD PRESSURE: 148 MMHG | BODY MASS INDEX: 32.78 KG/M2 | HEIGHT: 63 IN

## 2024-10-04 VITALS — SYSTOLIC BLOOD PRESSURE: 160 MMHG | DIASTOLIC BLOOD PRESSURE: 90 MMHG

## 2024-10-04 DIAGNOSIS — H61.23 IMPACTED CERUMEN, BILATERAL: ICD-10-CM

## 2024-10-04 DIAGNOSIS — Z48.89 ENCOUNTER FOR OTHER SPECIFIED SURGICAL AFTERCARE: ICD-10-CM

## 2024-10-04 DIAGNOSIS — Z87.42 PERSONAL HISTORY OF OTHER DISEASES OF THE FEMALE GENITAL TRACT: ICD-10-CM

## 2024-10-04 DIAGNOSIS — Z12.39 ENCOUNTER FOR OTHER SCREENING FOR MALIGNANT NEOPLASM OF BREAST: ICD-10-CM

## 2024-10-04 DIAGNOSIS — E66.9 OBESITY, UNSPECIFIED: ICD-10-CM

## 2024-10-04 DIAGNOSIS — M41.9 SCOLIOSIS, UNSPECIFIED: ICD-10-CM

## 2024-10-04 DIAGNOSIS — R76.8 OTHER SPECIFIED ABNORMAL IMMUNOLOGICAL FINDINGS IN SERUM: ICD-10-CM

## 2024-10-04 DIAGNOSIS — Z01.818 ENCOUNTER FOR OTHER PREPROCEDURAL EXAMINATION: ICD-10-CM

## 2024-10-04 DIAGNOSIS — I10 ESSENTIAL (PRIMARY) HYPERTENSION: ICD-10-CM

## 2024-10-04 DIAGNOSIS — K20.90 ESOPHAGITIS, UNSPECIFIED WITHOUT BLEEDING: ICD-10-CM

## 2024-10-04 DIAGNOSIS — Z01.00 ENCOUNTER FOR EXAMINATION OF EYES AND VISION W/OUT ABNORMAL FINDINGS: ICD-10-CM

## 2024-10-04 DIAGNOSIS — Z00.00 ENCOUNTER FOR GENERAL ADULT MEDICAL EXAMINATION W/OUT ABNORMAL FINDINGS: ICD-10-CM

## 2024-10-04 DIAGNOSIS — R87.810 CERVICAL HIGH RISK HUMAN PAPILLOMAVIRUS (HPV) DNA TEST POSITIVE: ICD-10-CM

## 2024-10-04 PROCEDURE — 99396 PREV VISIT EST AGE 40-64: CPT

## 2024-10-04 PROCEDURE — 93000 ELECTROCARDIOGRAM COMPLETE: CPT

## 2024-10-04 PROCEDURE — 36415 COLL VENOUS BLD VENIPUNCTURE: CPT

## 2024-10-04 PROCEDURE — 99213 OFFICE O/P EST LOW 20 MIN: CPT | Mod: 25

## 2024-10-04 RX ORDER — LISINOPRIL 5 MG/1
5 TABLET ORAL DAILY
Qty: 30 | Refills: 0 | Status: ACTIVE | COMMUNITY
Start: 2024-10-04 | End: 1900-01-01

## 2024-10-04 RX ORDER — MULTIVIT-MIN/IRON/FOLIC ACID/K 18-600-40
CAPSULE ORAL
Refills: 0 | Status: ACTIVE | COMMUNITY

## 2024-10-04 RX ORDER — EAR PLUGS
EACH OTIC (EAR)
Refills: 0 | Status: ACTIVE | COMMUNITY

## 2024-10-04 NOTE — ASSESSMENT
[FreeTextEntry1] : CPE Mammogram referral given Pap in 2022, patient had a hysterectomy Updated with colonoscopy Declines flu vaccine EKG done.  Labs ordered
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
## 2024-10-04 NOTE — HEALTH RISK ASSESSMENT
[Yes] : Yes [2 - 4 times a month (2 pts)] : 2-4 times a month (2 points) [3 or 4 (1 pt)] : 3 or 4  (1 point) [Never (0 pts)] : Never (0 points) [0] : 2) Feeling down, depressed, or hopeless: Not at all (0) [Patient reported PAP Smear was normal] : Patient reported PAP Smear was normal [Patient reported colonoscopy was normal] : Patient reported colonoscopy was normal [None] : None [With Family] : lives with family [# of Members in Household ___] :  household currently consist of [unfilled] member(s) [Employed] : employed [] :  [Sexually Active] : sexually active [Former] : Former [Audit-CScore] : 3 [de-identified] : exercising 5 times/week.  [de-identified] : 2 meals per day.  [[id number]] : 0 [MammogramComments] : never [PapSmearDate] : 06/22 [ColonoscopyDate] : 05/23 [de-identified] : Quit 10 years ago.  Smoked 1ppd and vaping until 5 years ago.

## 2024-10-04 NOTE — PHYSICAL EXAM
[No Acute Distress] : no acute distress [Well Developed] : well developed [Well-Appearing] : well-appearing [Normal Sclera/Conjunctiva] : normal sclera/conjunctiva [PERRL] : pupils equal round and reactive to light [EOMI] : extraocular movements intact [Normal Outer Ear/Nose] : the outer ears and nose were normal in appearance [Normal Oropharynx] : the oropharynx was normal [No JVD] : no jugular venous distention [No Lymphadenopathy] : no lymphadenopathy [Supple] : supple [Thyroid Normal, No Nodules] : the thyroid was normal and there were no nodules present [No Respiratory Distress] : no respiratory distress  [No Accessory Muscle Use] : no accessory muscle use [Clear to Auscultation] : lungs were clear to auscultation bilaterally [Normal Rate] : normal rate  [Regular Rhythm] : with a regular rhythm [Normal S1, S2] : normal S1 and S2 [No Murmur] : no murmur heard [Pedal Pulses Present] : the pedal pulses are present [No Edema] : there was no peripheral edema [No Palpable Aorta] : no palpable aorta [No Extremity Clubbing/Cyanosis] : no extremity clubbing/cyanosis [Soft] : abdomen soft [Non Tender] : non-tender [Non-distended] : non-distended [No HSM] : no HSM [Normal Bowel Sounds] : normal bowel sounds [Normal Posterior Cervical Nodes] : no posterior cervical lymphadenopathy [Normal Anterior Cervical Nodes] : no anterior cervical lymphadenopathy [No CVA Tenderness] : no CVA  tenderness [No Spinal Tenderness] : no spinal tenderness [No Joint Swelling] : no joint swelling [Grossly Normal Strength/Tone] : grossly normal strength/tone [No Rash] : no rash [Coordination Grossly Intact] : coordination grossly intact [No Focal Deficits] : no focal deficits [Normal Gait] : normal gait [Deep Tendon Reflexes (DTR)] : deep tendon reflexes were 2+ and symmetric [Normal Affect] : the affect was normal [Normal Insight/Judgement] : insight and judgment were intact [Normal TMs] : both tympanic membranes were normal [No Varicosities] : no varicosities [Normal Supraclavicular Nodes] : no supraclavicular lymphadenopathy [Normal Mood] : the mood was normal

## 2024-10-07 LAB
ALBUMIN SERPL ELPH-MCNC: 4.5 G/DL
ALP BLD-CCNC: 79 U/L
ALT SERPL-CCNC: 17 U/L
ANION GAP SERPL CALC-SCNC: 15 MMOL/L
AST SERPL-CCNC: 24 U/L
BASOPHILS # BLD AUTO: 0.05 K/UL
BASOPHILS NFR BLD AUTO: 0.8 %
BILIRUB SERPL-MCNC: 0.5 MG/DL
BUN SERPL-MCNC: 17 MG/DL
CALCIUM SERPL-MCNC: 9.6 MG/DL
CHLORIDE SERPL-SCNC: 101 MMOL/L
CHOLEST SERPL-MCNC: 181 MG/DL
CO2 SERPL-SCNC: 23 MMOL/L
CREAT SERPL-MCNC: 0.71 MG/DL
EGFR: 107 ML/MIN/1.73M2
EOSINOPHIL # BLD AUTO: 0.16 K/UL
EOSINOPHIL NFR BLD AUTO: 2.4 %
ESTIMATED AVERAGE GLUCOSE: 108 MG/DL
FOLATE SERPL-MCNC: 17.7 NG/ML
GLUCOSE SERPL-MCNC: 67 MG/DL
HBA1C MFR BLD HPLC: 5.4 %
HCT VFR BLD CALC: 38.7 %
HDLC SERPL-MCNC: 114 MG/DL
HGB BLD-MCNC: 12.3 G/DL
IMM GRANULOCYTES NFR BLD AUTO: 0.3 %
LDLC SERPL CALC-MCNC: 56 MG/DL
LYMPHOCYTES # BLD AUTO: 1.99 K/UL
LYMPHOCYTES NFR BLD AUTO: 30.2 %
MAN DIFF?: NORMAL
MCHC RBC-ENTMCNC: 27.3 PG
MCHC RBC-ENTMCNC: 31.8 GM/DL
MCV RBC AUTO: 86 FL
MONOCYTES # BLD AUTO: 0.47 K/UL
MONOCYTES NFR BLD AUTO: 7.1 %
NEUTROPHILS # BLD AUTO: 3.89 K/UL
NEUTROPHILS NFR BLD AUTO: 59.2 %
NONHDLC SERPL-MCNC: 67 MG/DL
PLATELET # BLD AUTO: 288 K/UL
POTASSIUM SERPL-SCNC: 5 MMOL/L
PROT SERPL-MCNC: 8 G/DL
RBC # BLD: 4.5 M/UL
RBC # FLD: 13.4 %
SODIUM SERPL-SCNC: 140 MMOL/L
TRIGL SERPL-MCNC: 56 MG/DL
TSH SERPL-ACNC: 1.37 UIU/ML
VIT B12 SERPL-MCNC: 779 PG/ML
WBC # FLD AUTO: 6.58 K/UL

## 2024-10-10 DIAGNOSIS — M35.3 POLYMYALGIA RHEUMATICA: ICD-10-CM

## 2024-10-10 LAB
ANA PAT FLD IF-IMP: ABNORMAL
ANA SER IF-ACNC: ABNORMAL

## 2024-10-12 ENCOUNTER — APPOINTMENT (OUTPATIENT)
Dept: MAMMOGRAPHY | Facility: CLINIC | Age: 44
End: 2024-10-12
Payer: COMMERCIAL

## 2024-10-12 ENCOUNTER — OUTPATIENT (OUTPATIENT)
Dept: OUTPATIENT SERVICES | Facility: HOSPITAL | Age: 44
LOS: 1 days | End: 2024-10-12
Payer: COMMERCIAL

## 2024-10-12 ENCOUNTER — RESULT REVIEW (OUTPATIENT)
Age: 44
End: 2024-10-12

## 2024-10-12 DIAGNOSIS — Z98.84 BARIATRIC SURGERY STATUS: Chronic | ICD-10-CM

## 2024-10-12 DIAGNOSIS — Z90.49 ACQUIRED ABSENCE OF OTHER SPECIFIED PARTS OF DIGESTIVE TRACT: Chronic | ICD-10-CM

## 2024-10-12 DIAGNOSIS — Z00.00 ENCOUNTER FOR GENERAL ADULT MEDICAL EXAMINATION WITHOUT ABNORMAL FINDINGS: ICD-10-CM

## 2024-10-12 DIAGNOSIS — Z12.39 ENCOUNTER FOR OTHER SCREENING FOR MALIGNANT NEOPLASM OF BREAST: ICD-10-CM

## 2024-10-12 PROCEDURE — 77067 SCR MAMMO BI INCL CAD: CPT

## 2024-10-12 PROCEDURE — 77063 BREAST TOMOSYNTHESIS BI: CPT

## 2024-10-12 PROCEDURE — 77067 SCR MAMMO BI INCL CAD: CPT | Mod: 26

## 2024-10-12 PROCEDURE — 77063 BREAST TOMOSYNTHESIS BI: CPT | Mod: 26

## 2024-11-04 ENCOUNTER — RX RENEWAL (OUTPATIENT)
Age: 44
End: 2024-11-04

## 2024-11-12 ENCOUNTER — APPOINTMENT (OUTPATIENT)
Dept: FAMILY MEDICINE | Facility: CLINIC | Age: 44
End: 2024-11-12
Payer: COMMERCIAL

## 2024-11-12 VITALS
HEART RATE: 68 BPM | SYSTOLIC BLOOD PRESSURE: 120 MMHG | DIASTOLIC BLOOD PRESSURE: 78 MMHG | BODY MASS INDEX: 33.02 KG/M2 | OXYGEN SATURATION: 98 % | WEIGHT: 186.4 LBS | RESPIRATION RATE: 16 BRPM | TEMPERATURE: 97.3 F

## 2024-11-12 DIAGNOSIS — E66.9 OBESITY, UNSPECIFIED: ICD-10-CM

## 2024-11-12 DIAGNOSIS — I10 ESSENTIAL (PRIMARY) HYPERTENSION: ICD-10-CM

## 2024-11-12 PROCEDURE — 99213 OFFICE O/P EST LOW 20 MIN: CPT

## 2024-11-12 PROCEDURE — 36415 COLL VENOUS BLD VENIPUNCTURE: CPT

## 2024-11-13 LAB
ANION GAP SERPL CALC-SCNC: 12 MMOL/L
BUN SERPL-MCNC: 14 MG/DL
CALCIUM SERPL-MCNC: 9.2 MG/DL
CHLORIDE SERPL-SCNC: 102 MMOL/L
CO2 SERPL-SCNC: 23 MMOL/L
CREAT SERPL-MCNC: 0.68 MG/DL
EGFR: 110 ML/MIN/1.73M2
GLUCOSE SERPL-MCNC: 108 MG/DL
POTASSIUM SERPL-SCNC: 4.7 MMOL/L
SODIUM SERPL-SCNC: 138 MMOL/L

## 2024-11-22 ENCOUNTER — APPOINTMENT (OUTPATIENT)
Dept: RHEUMATOLOGY | Facility: CLINIC | Age: 44
End: 2024-11-22
Payer: COMMERCIAL

## 2024-11-22 VITALS
OXYGEN SATURATION: 97 % | SYSTOLIC BLOOD PRESSURE: 150 MMHG | TEMPERATURE: 97.1 F | HEIGHT: 63 IN | BODY MASS INDEX: 32.43 KG/M2 | WEIGHT: 183 LBS | HEART RATE: 56 BPM | DIASTOLIC BLOOD PRESSURE: 92 MMHG

## 2024-11-22 DIAGNOSIS — L50.9 URTICARIA, UNSPECIFIED: ICD-10-CM

## 2024-11-22 DIAGNOSIS — R76.8 OTHER SPECIFIED ABNORMAL IMMUNOLOGICAL FINDINGS IN SERUM: ICD-10-CM

## 2024-11-22 DIAGNOSIS — R52 PAIN, UNSPECIFIED: ICD-10-CM

## 2024-11-22 PROCEDURE — 99204 OFFICE O/P NEW MOD 45 MIN: CPT

## 2024-11-22 PROCEDURE — 36415 COLL VENOUS BLD VENIPUNCTURE: CPT

## 2024-11-23 LAB
ALBUMIN SERPL ELPH-MCNC: 4.3 G/DL
ALP BLD-CCNC: 63 U/L
ALT SERPL-CCNC: 13 U/L
ANION GAP SERPL CALC-SCNC: 14 MMOL/L
AST SERPL-CCNC: 18 U/L
BILIRUB SERPL-MCNC: 0.2 MG/DL
BUN SERPL-MCNC: 12 MG/DL
C3 SERPL-MCNC: 119 MG/DL
C4 SERPL-MCNC: 28 MG/DL
CALCIUM SERPL-MCNC: 9.2 MG/DL
CHLORIDE SERPL-SCNC: 105 MMOL/L
CO2 SERPL-SCNC: 22 MMOL/L
CREAT SERPL-MCNC: 0.61 MG/DL
CRP SERPL-MCNC: <3 MG/L
DSDNA AB SER-ACNC: <1 IU/ML
EGFR: 113 ML/MIN/1.73M2
ENA RNP AB SER IA-ACNC: <0.2 AL
ENA SM AB SER IA-ACNC: <0.2 AL
ENA SS-A AB SER IA-ACNC: <0.2 AL
ENA SS-B AB SER IA-ACNC: <0.2 AL
ERYTHROCYTE [SEDIMENTATION RATE] IN BLOOD BY WESTERGREN METHOD: 39 MM/HR
GLUCOSE SERPL-MCNC: 81 MG/DL
HCT VFR BLD CALC: 35.5 %
HGB BLD-MCNC: 11.3 G/DL
MCHC RBC-ENTMCNC: 27.9 PG
MCHC RBC-ENTMCNC: 31.8 G/DL
MCV RBC AUTO: 87.7 FL
PLATELET # BLD AUTO: 241 K/UL
POTASSIUM SERPL-SCNC: 4.5 MMOL/L
PROT SERPL-MCNC: 7.4 G/DL
RBC # BLD: 4.05 M/UL
RBC # FLD: 13.6 %
SODIUM SERPL-SCNC: 141 MMOL/L
THYROGLOB AB SERPL-ACNC: <15 IU/ML
THYROPEROXIDASE AB SERPL IA-ACNC: 12.2 IU/ML
WBC # FLD AUTO: 6 K/UL

## 2024-11-25 LAB — ANA SER IF-ACNC: NEGATIVE

## 2024-11-27 ENCOUNTER — APPOINTMENT (OUTPATIENT)
Dept: OBGYN | Facility: CLINIC | Age: 44
End: 2024-11-27

## 2024-11-27 VITALS
HEART RATE: 61 BPM | SYSTOLIC BLOOD PRESSURE: 148 MMHG | DIASTOLIC BLOOD PRESSURE: 89 MMHG | BODY MASS INDEX: 32.96 KG/M2 | WEIGHT: 186 LBS | HEIGHT: 63 IN

## 2024-11-27 DIAGNOSIS — Z01.419 ENCOUNTER FOR GYNECOLOGICAL EXAMINATION (GENERAL) (ROUTINE) W/OUT ABNORMAL FINDINGS: ICD-10-CM

## 2024-11-27 PROCEDURE — 99459 PELVIC EXAMINATION: CPT

## 2024-11-27 PROCEDURE — 99396 PREV VISIT EST AGE 40-64: CPT

## 2024-11-28 LAB — HPV HIGH+LOW RISK DNA PNL CVX: NOT DETECTED

## 2024-12-02 ENCOUNTER — RX RENEWAL (OUTPATIENT)
Age: 44
End: 2024-12-02

## 2024-12-02 DIAGNOSIS — M25.569 PAIN IN UNSPECIFIED KNEE: ICD-10-CM

## 2024-12-04 LAB — CYTOLOGY CVX/VAG DOC THIN PREP: ABNORMAL

## 2024-12-06 ENCOUNTER — APPOINTMENT (OUTPATIENT)
Dept: PSYCHIATRY | Facility: CLINIC | Age: 44
End: 2024-12-06
Payer: COMMERCIAL

## 2024-12-06 PROCEDURE — 90791 PSYCH DIAGNOSTIC EVALUATION: CPT | Mod: 95

## 2024-12-26 ENCOUNTER — APPOINTMENT (OUTPATIENT)
Dept: PSYCHIATRY | Facility: CLINIC | Age: 44
End: 2024-12-26
Payer: COMMERCIAL

## 2024-12-26 DIAGNOSIS — F43.23 ADJUSTMENT DISORDER WITH MIXED ANXIETY AND DEPRESSED MOOD: ICD-10-CM

## 2024-12-26 PROCEDURE — 90791 PSYCH DIAGNOSTIC EVALUATION: CPT | Mod: 95

## 2024-12-29 PROBLEM — F43.23 ACUTE ADJUSTMENT DISORDER WITH MIXED ANXIETY AND DEPRESSED MOOD: Status: ACTIVE | Noted: 2024-12-29

## 2025-01-03 ENCOUNTER — APPOINTMENT (OUTPATIENT)
Dept: PSYCHIATRY | Facility: CLINIC | Age: 45
End: 2025-01-03
Payer: COMMERCIAL

## 2025-01-03 PROCEDURE — 90791 PSYCH DIAGNOSTIC EVALUATION: CPT | Mod: 95

## 2025-01-10 ENCOUNTER — APPOINTMENT (OUTPATIENT)
Dept: PSYCHIATRY | Facility: CLINIC | Age: 45
End: 2025-01-10
Payer: COMMERCIAL

## 2025-01-10 DIAGNOSIS — F43.23 ADJUSTMENT DISORDER WITH MIXED ANXIETY AND DEPRESSED MOOD: ICD-10-CM

## 2025-01-10 PROCEDURE — 90837 PSYTX W PT 60 MINUTES: CPT | Mod: 95

## 2025-01-17 ENCOUNTER — APPOINTMENT (OUTPATIENT)
Dept: PSYCHIATRY | Facility: CLINIC | Age: 45
End: 2025-01-17
Payer: COMMERCIAL

## 2025-01-17 DIAGNOSIS — F43.23 ADJUSTMENT DISORDER WITH MIXED ANXIETY AND DEPRESSED MOOD: ICD-10-CM

## 2025-01-17 PROCEDURE — 90837 PSYTX W PT 60 MINUTES: CPT | Mod: 95

## 2025-01-24 ENCOUNTER — APPOINTMENT (OUTPATIENT)
Dept: PSYCHIATRY | Facility: CLINIC | Age: 45
End: 2025-01-24

## 2025-01-31 ENCOUNTER — APPOINTMENT (OUTPATIENT)
Dept: PSYCHIATRY | Facility: CLINIC | Age: 45
End: 2025-01-31
Payer: COMMERCIAL

## 2025-01-31 DIAGNOSIS — F43.23 ADJUSTMENT DISORDER WITH MIXED ANXIETY AND DEPRESSED MOOD: ICD-10-CM

## 2025-01-31 PROCEDURE — 90837 PSYTX W PT 60 MINUTES: CPT | Mod: 95

## 2025-02-07 ENCOUNTER — APPOINTMENT (OUTPATIENT)
Dept: PSYCHIATRY | Facility: CLINIC | Age: 45
End: 2025-02-07
Payer: COMMERCIAL

## 2025-02-07 DIAGNOSIS — F43.23 ADJUSTMENT DISORDER WITH MIXED ANXIETY AND DEPRESSED MOOD: ICD-10-CM

## 2025-02-07 PROCEDURE — 90837 PSYTX W PT 60 MINUTES: CPT | Mod: 95

## 2025-02-14 ENCOUNTER — APPOINTMENT (OUTPATIENT)
Dept: PSYCHIATRY | Facility: CLINIC | Age: 45
End: 2025-02-14

## 2025-02-21 ENCOUNTER — APPOINTMENT (OUTPATIENT)
Dept: PSYCHIATRY | Facility: CLINIC | Age: 45
End: 2025-02-21
Payer: COMMERCIAL

## 2025-02-21 DIAGNOSIS — F43.23 ADJUSTMENT DISORDER WITH MIXED ANXIETY AND DEPRESSED MOOD: ICD-10-CM

## 2025-02-21 PROCEDURE — 90837 PSYTX W PT 60 MINUTES: CPT | Mod: 95

## 2025-02-28 ENCOUNTER — APPOINTMENT (OUTPATIENT)
Dept: PSYCHIATRY | Facility: CLINIC | Age: 45
End: 2025-02-28

## 2025-03-03 ENCOUNTER — RX RENEWAL (OUTPATIENT)
Age: 45
End: 2025-03-03

## 2025-03-06 ENCOUNTER — NON-APPOINTMENT (OUTPATIENT)
Age: 45
End: 2025-03-06

## 2025-03-07 ENCOUNTER — APPOINTMENT (OUTPATIENT)
Dept: PSYCHIATRY | Facility: CLINIC | Age: 45
End: 2025-03-07

## 2025-03-07 ENCOUNTER — APPOINTMENT (OUTPATIENT)
Dept: ORTHOPEDIC SURGERY | Facility: CLINIC | Age: 45
End: 2025-03-07
Payer: COMMERCIAL

## 2025-03-07 VITALS
BODY MASS INDEX: 32.96 KG/M2 | SYSTOLIC BLOOD PRESSURE: 150 MMHG | HEIGHT: 63 IN | DIASTOLIC BLOOD PRESSURE: 84 MMHG | WEIGHT: 186 LBS

## 2025-03-07 PROCEDURE — G2211 COMPLEX E/M VISIT ADD ON: CPT

## 2025-03-07 PROCEDURE — 99203 OFFICE O/P NEW LOW 30 MIN: CPT

## 2025-03-07 PROCEDURE — 73564 X-RAY EXAM KNEE 4 OR MORE: CPT | Mod: RT

## 2025-03-14 ENCOUNTER — APPOINTMENT (OUTPATIENT)
Dept: PSYCHIATRY | Facility: CLINIC | Age: 45
End: 2025-03-14

## 2025-03-21 ENCOUNTER — APPOINTMENT (OUTPATIENT)
Dept: PSYCHIATRY | Facility: CLINIC | Age: 45
End: 2025-03-21
Payer: COMMERCIAL

## 2025-03-21 DIAGNOSIS — F43.23 ADJUSTMENT DISORDER WITH MIXED ANXIETY AND DEPRESSED MOOD: ICD-10-CM

## 2025-03-21 PROCEDURE — 90837 PSYTX W PT 60 MINUTES: CPT | Mod: 95

## 2025-03-26 ENCOUNTER — APPOINTMENT (OUTPATIENT)
Dept: PSYCHIATRY | Facility: CLINIC | Age: 45
End: 2025-03-26
Payer: COMMERCIAL

## 2025-03-26 PROBLEM — F10.10 ALCOHOL ABUSE: Status: ACTIVE | Noted: 2025-03-26

## 2025-03-26 PROBLEM — F32.A DEPRESSION, UNSPECIFIED: Status: ACTIVE | Noted: 2025-03-26

## 2025-03-26 PROCEDURE — 90792 PSYCH DIAG EVAL W/MED SRVCS: CPT | Mod: 95

## 2025-03-28 ENCOUNTER — APPOINTMENT (OUTPATIENT)
Dept: PSYCHIATRY | Facility: CLINIC | Age: 45
End: 2025-03-28
Payer: COMMERCIAL

## 2025-03-28 DIAGNOSIS — F43.23 ADJUSTMENT DISORDER WITH MIXED ANXIETY AND DEPRESSED MOOD: ICD-10-CM

## 2025-03-28 PROCEDURE — 90837 PSYTX W PT 60 MINUTES: CPT | Mod: 95

## 2025-04-03 ENCOUNTER — NON-APPOINTMENT (OUTPATIENT)
Age: 45
End: 2025-04-03

## 2025-04-04 ENCOUNTER — APPOINTMENT (OUTPATIENT)
Dept: PSYCHIATRY | Facility: CLINIC | Age: 45
End: 2025-04-04
Payer: COMMERCIAL

## 2025-04-04 ENCOUNTER — APPOINTMENT (OUTPATIENT)
Dept: FAMILY MEDICINE | Facility: CLINIC | Age: 45
End: 2025-04-04
Payer: COMMERCIAL

## 2025-04-04 ENCOUNTER — APPOINTMENT (OUTPATIENT)
Dept: ORTHOPEDIC SURGERY | Facility: CLINIC | Age: 45
End: 2025-04-04
Payer: COMMERCIAL

## 2025-04-04 VITALS
BODY MASS INDEX: 32.78 KG/M2 | WEIGHT: 185 LBS | HEART RATE: 64 BPM | HEIGHT: 63 IN | DIASTOLIC BLOOD PRESSURE: 96 MMHG | SYSTOLIC BLOOD PRESSURE: 146 MMHG

## 2025-04-04 VITALS
BODY MASS INDEX: 33.49 KG/M2 | OXYGEN SATURATION: 98 % | SYSTOLIC BLOOD PRESSURE: 152 MMHG | WEIGHT: 189 LBS | HEART RATE: 73 BPM | DIASTOLIC BLOOD PRESSURE: 92 MMHG | HEIGHT: 63 IN

## 2025-04-04 DIAGNOSIS — F43.23 ADJUSTMENT DISORDER WITH MIXED ANXIETY AND DEPRESSED MOOD: ICD-10-CM

## 2025-04-04 DIAGNOSIS — I10 ESSENTIAL (PRIMARY) HYPERTENSION: ICD-10-CM

## 2025-04-04 DIAGNOSIS — F32.A DEPRESSION, UNSPECIFIED: ICD-10-CM

## 2025-04-04 DIAGNOSIS — M47.812 SPONDYLOSIS W/OUT MYELOPATHY OR RADICULOPATHY, CERVICAL REGION: ICD-10-CM

## 2025-04-04 DIAGNOSIS — D64.9 ANEMIA, UNSPECIFIED: ICD-10-CM

## 2025-04-04 DIAGNOSIS — F10.10 ALCOHOL ABUSE, UNCOMPLICATED: ICD-10-CM

## 2025-04-04 DIAGNOSIS — K20.90 ESOPHAGITIS, UNSPECIFIED WITHOUT BLEEDING: ICD-10-CM

## 2025-04-04 PROCEDURE — 90839 PSYTX CRISIS INITIAL 60 MIN: CPT | Mod: 95

## 2025-04-04 PROCEDURE — 36415 COLL VENOUS BLD VENIPUNCTURE: CPT

## 2025-04-04 PROCEDURE — 72050 X-RAY EXAM NECK SPINE 4/5VWS: CPT

## 2025-04-04 PROCEDURE — 99204 OFFICE O/P NEW MOD 45 MIN: CPT

## 2025-04-04 PROCEDURE — 99214 OFFICE O/P EST MOD 30 MIN: CPT

## 2025-04-08 LAB
ALBUMIN SERPL ELPH-MCNC: 4.4 G/DL
ALP BLD-CCNC: 70 U/L
ALT SERPL-CCNC: 14 U/L
ANION GAP SERPL CALC-SCNC: 14 MMOL/L
AST SERPL-CCNC: 19 U/L
BILIRUB SERPL-MCNC: 0.3 MG/DL
BUN SERPL-MCNC: 15 MG/DL
CALCIUM SERPL-MCNC: 9.6 MG/DL
CHLORIDE SERPL-SCNC: 103 MMOL/L
CO2 SERPL-SCNC: 23 MMOL/L
CREAT SERPL-MCNC: 0.64 MG/DL
EGFRCR SERPLBLD CKD-EPI 2021: 112 ML/MIN/1.73M2
FERRITIN SERPL-MCNC: 118 NG/ML
GLUCOSE SERPL-MCNC: 90 MG/DL
HCT VFR BLD CALC: 36.7 %
HGB BLD-MCNC: 11.5 G/DL
IRON SATN MFR SERPL: 21 %
IRON SERPL-MCNC: 82 UG/DL
MCHC RBC-ENTMCNC: 27.4 PG
MCHC RBC-ENTMCNC: 31.3 G/DL
MCV RBC AUTO: 87.6 FL
PLATELET # BLD AUTO: 247 K/UL
POTASSIUM SERPL-SCNC: 4.6 MMOL/L
PROT SERPL-MCNC: 7.7 G/DL
RBC # BLD: 4.19 M/UL
RBC # FLD: 13.8 %
SODIUM SERPL-SCNC: 140 MMOL/L
TIBC SERPL-MCNC: 387 UG/DL
UIBC SERPL-MCNC: 304 UG/DL
WBC # FLD AUTO: 7.4 K/UL

## 2025-04-11 ENCOUNTER — APPOINTMENT (OUTPATIENT)
Dept: PSYCHIATRY | Facility: CLINIC | Age: 45
End: 2025-04-11
Payer: COMMERCIAL

## 2025-04-11 DIAGNOSIS — F43.23 ADJUSTMENT DISORDER WITH MIXED ANXIETY AND DEPRESSED MOOD: ICD-10-CM

## 2025-04-11 DIAGNOSIS — F10.10 ALCOHOL ABUSE, UNCOMPLICATED: ICD-10-CM

## 2025-04-11 PROCEDURE — 90837 PSYTX W PT 60 MINUTES: CPT | Mod: 95

## 2025-04-18 ENCOUNTER — APPOINTMENT (OUTPATIENT)
Dept: PSYCHIATRY | Facility: CLINIC | Age: 45
End: 2025-04-18

## 2025-04-25 ENCOUNTER — APPOINTMENT (OUTPATIENT)
Dept: PSYCHIATRY | Facility: CLINIC | Age: 45
End: 2025-04-25
Payer: COMMERCIAL

## 2025-04-25 ENCOUNTER — APPOINTMENT (OUTPATIENT)
Dept: DERMATOLOGY | Facility: CLINIC | Age: 45
End: 2025-04-25
Payer: COMMERCIAL

## 2025-04-25 DIAGNOSIS — F10.10 ALCOHOL ABUSE, UNCOMPLICATED: ICD-10-CM

## 2025-04-25 DIAGNOSIS — F43.23 ADJUSTMENT DISORDER WITH MIXED ANXIETY AND DEPRESSED MOOD: ICD-10-CM

## 2025-04-25 PROCEDURE — 11102 TANGNTL BX SKIN SINGLE LES: CPT

## 2025-04-25 PROCEDURE — 99214 OFFICE O/P EST MOD 30 MIN: CPT | Mod: 25

## 2025-04-25 PROCEDURE — 90837 PSYTX W PT 60 MINUTES: CPT | Mod: 95

## 2025-05-02 ENCOUNTER — APPOINTMENT (OUTPATIENT)
Dept: PSYCHIATRY | Facility: CLINIC | Age: 45
End: 2025-05-02
Payer: COMMERCIAL

## 2025-05-02 DIAGNOSIS — F43.23 ADJUSTMENT DISORDER WITH MIXED ANXIETY AND DEPRESSED MOOD: ICD-10-CM

## 2025-05-02 PROCEDURE — 90837 PSYTX W PT 60 MINUTES: CPT | Mod: 95

## 2025-05-05 RX ORDER — LISINOPRIL 10 MG/1
10 TABLET ORAL DAILY
Qty: 90 | Refills: 2 | Status: ACTIVE | COMMUNITY
Start: 2025-05-05 | End: 1900-01-01

## 2025-05-12 ENCOUNTER — APPOINTMENT (OUTPATIENT)
Dept: PSYCHIATRY | Facility: CLINIC | Age: 45
End: 2025-05-12
Payer: COMMERCIAL

## 2025-05-12 DIAGNOSIS — F10.10 ALCOHOL ABUSE, UNCOMPLICATED: ICD-10-CM

## 2025-05-12 DIAGNOSIS — F43.23 ADJUSTMENT DISORDER WITH MIXED ANXIETY AND DEPRESSED MOOD: ICD-10-CM

## 2025-05-12 PROCEDURE — 90837 PSYTX W PT 60 MINUTES: CPT | Mod: 95

## 2025-07-18 ENCOUNTER — APPOINTMENT (OUTPATIENT)
Dept: FAMILY MEDICINE | Facility: CLINIC | Age: 45
End: 2025-07-18